# Patient Record
Sex: FEMALE | Race: OTHER | HISPANIC OR LATINO | URBAN - METROPOLITAN AREA
[De-identification: names, ages, dates, MRNs, and addresses within clinical notes are randomized per-mention and may not be internally consistent; named-entity substitution may affect disease eponyms.]

---

## 2019-05-15 ENCOUNTER — INPATIENT (INPATIENT)
Facility: HOSPITAL | Age: 44
LOS: 8 days | Discharge: HOME | DRG: 582 | End: 2019-05-24
Attending: INTERNAL MEDICINE | Admitting: INTERNAL MEDICINE
Payer: MEDICAID

## 2019-05-15 VITALS
DIASTOLIC BLOOD PRESSURE: 107 MMHG | HEART RATE: 110 BPM | SYSTOLIC BLOOD PRESSURE: 174 MMHG | WEIGHT: 139.99 LBS | RESPIRATION RATE: 20 BRPM | TEMPERATURE: 100 F | OXYGEN SATURATION: 96 % | HEIGHT: 64 IN

## 2019-05-15 LAB
ALBUMIN SERPL ELPH-MCNC: 3 G/DL — LOW (ref 3.5–5)
ALP SERPL-CCNC: 77 U/L — SIGNIFICANT CHANGE UP (ref 40–120)
ALT FLD-CCNC: 68 U/L DA — HIGH (ref 10–60)
ANION GAP SERPL CALC-SCNC: 8 MMOL/L — SIGNIFICANT CHANGE UP (ref 5–17)
AST SERPL-CCNC: 91 U/L — HIGH (ref 10–40)
BASOPHILS # BLD AUTO: 0.03 K/UL — SIGNIFICANT CHANGE UP (ref 0–0.2)
BASOPHILS NFR BLD AUTO: 0.4 % — SIGNIFICANT CHANGE UP (ref 0–2)
BILIRUB SERPL-MCNC: 0.8 MG/DL — SIGNIFICANT CHANGE UP (ref 0.2–1.2)
BUN SERPL-MCNC: 6 MG/DL — LOW (ref 7–18)
CALCIUM SERPL-MCNC: 7.9 MG/DL — LOW (ref 8.4–10.5)
CHLORIDE SERPL-SCNC: 108 MMOL/L — SIGNIFICANT CHANGE UP (ref 96–108)
CO2 SERPL-SCNC: 23 MMOL/L — SIGNIFICANT CHANGE UP (ref 22–31)
CREAT SERPL-MCNC: 0.6 MG/DL — SIGNIFICANT CHANGE UP (ref 0.5–1.3)
EOSINOPHIL # BLD AUTO: 0.02 K/UL — SIGNIFICANT CHANGE UP (ref 0–0.5)
EOSINOPHIL NFR BLD AUTO: 0.3 % — SIGNIFICANT CHANGE UP (ref 0–6)
GLUCOSE SERPL-MCNC: 80 MG/DL — SIGNIFICANT CHANGE UP (ref 70–99)
HCG SERPL-ACNC: <1 MIU/ML — SIGNIFICANT CHANGE UP
HCT VFR BLD CALC: 44.2 % — SIGNIFICANT CHANGE UP (ref 34.5–45)
HGB BLD-MCNC: 14.8 G/DL — SIGNIFICANT CHANGE UP (ref 11.5–15.5)
IMM GRANULOCYTES NFR BLD AUTO: 0.4 % — SIGNIFICANT CHANGE UP (ref 0–1.5)
LYMPHOCYTES # BLD AUTO: 1.22 K/UL — SIGNIFICANT CHANGE UP (ref 1–3.3)
LYMPHOCYTES # BLD AUTO: 18.3 % — SIGNIFICANT CHANGE UP (ref 13–44)
MCHC RBC-ENTMCNC: 30.8 PG — SIGNIFICANT CHANGE UP (ref 27–34)
MCHC RBC-ENTMCNC: 33.5 GM/DL — SIGNIFICANT CHANGE UP (ref 32–36)
MCV RBC AUTO: 92.1 FL — SIGNIFICANT CHANGE UP (ref 80–100)
MONOCYTES # BLD AUTO: 0.9 K/UL — SIGNIFICANT CHANGE UP (ref 0–0.9)
MONOCYTES NFR BLD AUTO: 13.5 % — SIGNIFICANT CHANGE UP (ref 2–14)
NEUTROPHILS # BLD AUTO: 4.48 K/UL — SIGNIFICANT CHANGE UP (ref 1.8–7.4)
NEUTROPHILS NFR BLD AUTO: 67.1 % — SIGNIFICANT CHANGE UP (ref 43–77)
NRBC # BLD: 0 /100 WBCS — SIGNIFICANT CHANGE UP (ref 0–0)
PLATELET # BLD AUTO: 227 K/UL — SIGNIFICANT CHANGE UP (ref 150–400)
POTASSIUM SERPL-MCNC: 3.2 MMOL/L — LOW (ref 3.5–5.3)
POTASSIUM SERPL-SCNC: 3.2 MMOL/L — LOW (ref 3.5–5.3)
PROT SERPL-MCNC: 6.8 G/DL — SIGNIFICANT CHANGE UP (ref 6–8.3)
RBC # BLD: 4.8 M/UL — SIGNIFICANT CHANGE UP (ref 3.8–5.2)
RBC # FLD: 12.9 % — SIGNIFICANT CHANGE UP (ref 10.3–14.5)
SODIUM SERPL-SCNC: 139 MMOL/L — SIGNIFICANT CHANGE UP (ref 135–145)
WBC # BLD: 6.68 K/UL — SIGNIFICANT CHANGE UP (ref 3.8–10.5)
WBC # FLD AUTO: 6.68 K/UL — SIGNIFICANT CHANGE UP (ref 3.8–10.5)

## 2019-05-15 PROCEDURE — 71260 CT THORAX DX C+: CPT | Mod: 26

## 2019-05-15 RX ORDER — MORPHINE SULFATE 50 MG/1
4 CAPSULE, EXTENDED RELEASE ORAL ONCE
Refills: 0 | Status: DISCONTINUED | OUTPATIENT
Start: 2019-05-15 | End: 2019-05-15

## 2019-05-15 RX ORDER — SODIUM CHLORIDE 9 MG/ML
1000 INJECTION INTRAMUSCULAR; INTRAVENOUS; SUBCUTANEOUS ONCE
Refills: 0 | Status: COMPLETED | OUTPATIENT
Start: 2019-05-15 | End: 2019-05-15

## 2019-05-15 RX ADMIN — SODIUM CHLORIDE 2000 MILLILITER(S): 9 INJECTION INTRAMUSCULAR; INTRAVENOUS; SUBCUTANEOUS at 22:44

## 2019-05-15 RX ADMIN — SODIUM CHLORIDE 1000 MILLILITER(S): 9 INJECTION INTRAMUSCULAR; INTRAVENOUS; SUBCUTANEOUS at 22:44

## 2019-05-15 RX ADMIN — MORPHINE SULFATE 4 MILLIGRAM(S): 50 CAPSULE, EXTENDED RELEASE ORAL at 22:43

## 2019-05-15 NOTE — ED PROVIDER NOTE - OBJECTIVE STATEMENT
42 y/o F with past hx of Breast CA and Brain CA (was off chemo for 4 years, but started again after her brain CA started) and HTN presents to the ED c/o breast pain. Pt notes that 3 years ago, she had surgery for breast CA and now is extremely swollen and has a lot of pain. Pt states that her breast has been swollen for approximately 1-2 weeks. She also notes of redness to the area. Pt denies fevers or nipple discharge. Pt is on memantine, Keppra, and amlodipine. She is allergic to penicillin.  Surgery: Dr. Navarrete  Oncology: Dr. Augie Brantley  [phone]   ID: [id number] 42 y/o F with past hx of Breast CA and Brain CA (was off chemo for 4 years, but started again after her brain CA started) and HTN presents to the ED c/o L breast pain. Pt notes that 3 years ago, she had surgery for L breast CA (Dr. Navarrete, Oncology: Dr. Augie Brantley, [phone]) and now is extremely swollen and has a lot of pain. Pt states that her breast has been swollen for approximately 1-2 weeks. She also notes of redness to the area. Pt denies fevers or nipple discharge. Pt is on memantine, Keppra, and amlodipine. She is allergic to penicillin (hives).   ID: [id number]

## 2019-05-15 NOTE — ED PROVIDER NOTE - PHYSICAL EXAMINATION
Afebrile, hemodynamically stable, saturating well  NAD, well appearing  Head NCAT  EOMI grossly, anicteric  MMM  RRR, nml S1/S2, no m/r/g  Lungs CTAB, no w/r/r  Breasts (LAVELL Hayes as chaperone): Swollen, tender, erythematous L lower outer breast with external erythematous growth, no nipple discharge or discoloration  AAO, CN's 3-12 grossly intact  SAMAYOA spontaneously, no leg cyanosis or edema  Skin warm, well perfused

## 2019-05-15 NOTE — ED PROVIDER NOTE - CLINICAL SUMMARY MEDICAL DECISION MAKING FREE TEXT BOX
No CP/SOB to suggest ACS/PE or other etiology. No CP/SOB to suggest ACS/PE or other etiology. Noted likely recurrence of breast cancer as well as effusion concerning for malignancy. No SOB or work of breathing. No signs of cord compression on exam. Patient well appearing, hemodynamically stable. Discussed with Dr. Ponce of surgery as courtesy as this is their past patient, and will see pt on admission. Admitted to internal medicine for further monitoring, w/u, and care. No CP/SOB to suggest ACS/PE or other etiology. Noted likely recurrence of breast cancer as well as effusion concerning for malignancy. No SOB or work of breathing. No signs of cord compression on exam. No fever and appearance low suspicion for cellulitis. Patient well appearing, hemodynamically stable. Discussed with Dr. Ponce of surgery as courtesy as this is their past patient, and will see pt on admission. Admitted to internal medicine for further monitoring, w/u, and care.

## 2019-05-15 NOTE — ED PROVIDER NOTE - CARE PLAN
Principal Discharge DX:	Metastatic breast cancer  Secondary Diagnosis:	Pleural effusion, malignant  Secondary Diagnosis:	Bony metastasis  Secondary Diagnosis:	Compression fracture of spine

## 2019-05-16 DIAGNOSIS — J90 PLEURAL EFFUSION, NOT ELSEWHERE CLASSIFIED: ICD-10-CM

## 2019-05-16 DIAGNOSIS — C50.919 MALIGNANT NEOPLASM OF UNSPECIFIED SITE OF UNSPECIFIED FEMALE BREAST: ICD-10-CM

## 2019-05-16 DIAGNOSIS — Z98.891 HISTORY OF UTERINE SCAR FROM PREVIOUS SURGERY: Chronic | ICD-10-CM

## 2019-05-16 DIAGNOSIS — Z90.49 ACQUIRED ABSENCE OF OTHER SPECIFIED PARTS OF DIGESTIVE TRACT: Chronic | ICD-10-CM

## 2019-05-16 DIAGNOSIS — L03.90 CELLULITIS, UNSPECIFIED: ICD-10-CM

## 2019-05-16 DIAGNOSIS — I10 ESSENTIAL (PRIMARY) HYPERTENSION: ICD-10-CM

## 2019-05-16 DIAGNOSIS — Z29.9 ENCOUNTER FOR PROPHYLACTIC MEASURES, UNSPECIFIED: ICD-10-CM

## 2019-05-16 LAB
ALBUMIN SERPL ELPH-MCNC: 2.9 G/DL — LOW (ref 3.5–5)
ALP SERPL-CCNC: 98 U/L — SIGNIFICANT CHANGE UP (ref 40–120)
ALT FLD-CCNC: 91 U/L DA — HIGH (ref 10–60)
ANION GAP SERPL CALC-SCNC: 7 MMOL/L — SIGNIFICANT CHANGE UP (ref 5–17)
AST SERPL-CCNC: 124 U/L — HIGH (ref 10–40)
BASOPHILS # BLD AUTO: 0.03 K/UL — SIGNIFICANT CHANGE UP (ref 0–0.2)
BASOPHILS NFR BLD AUTO: 0.4 % — SIGNIFICANT CHANGE UP (ref 0–2)
BILIRUB SERPL-MCNC: 0.8 MG/DL — SIGNIFICANT CHANGE UP (ref 0.2–1.2)
BUN SERPL-MCNC: 4 MG/DL — LOW (ref 7–18)
CALCIUM SERPL-MCNC: 7.5 MG/DL — LOW (ref 8.4–10.5)
CHLORIDE SERPL-SCNC: 107 MMOL/L — SIGNIFICANT CHANGE UP (ref 96–108)
CHOLEST SERPL-MCNC: 171 MG/DL — SIGNIFICANT CHANGE UP (ref 10–199)
CO2 SERPL-SCNC: 24 MMOL/L — SIGNIFICANT CHANGE UP (ref 22–31)
CREAT SERPL-MCNC: 0.51 MG/DL — SIGNIFICANT CHANGE UP (ref 0.5–1.3)
EOSINOPHIL # BLD AUTO: 0.02 K/UL — SIGNIFICANT CHANGE UP (ref 0–0.5)
EOSINOPHIL NFR BLD AUTO: 0.3 % — SIGNIFICANT CHANGE UP (ref 0–6)
GLUCOSE SERPL-MCNC: 93 MG/DL — SIGNIFICANT CHANGE UP (ref 70–99)
HBA1C BLD-MCNC: 5.5 % — SIGNIFICANT CHANGE UP (ref 4–5.6)
HCT VFR BLD CALC: 42.8 % — SIGNIFICANT CHANGE UP (ref 34.5–45)
HDLC SERPL-MCNC: 58 MG/DL — SIGNIFICANT CHANGE UP
HGB BLD-MCNC: 14.3 G/DL — SIGNIFICANT CHANGE UP (ref 11.5–15.5)
IMM GRANULOCYTES NFR BLD AUTO: 0.3 % — SIGNIFICANT CHANGE UP (ref 0–1.5)
LIPID PNL WITH DIRECT LDL SERPL: 98 MG/DL — SIGNIFICANT CHANGE UP
LYMPHOCYTES # BLD AUTO: 0.95 K/UL — LOW (ref 1–3.3)
LYMPHOCYTES # BLD AUTO: 13.6 % — SIGNIFICANT CHANGE UP (ref 13–44)
MAGNESIUM SERPL-MCNC: 2.1 MG/DL — SIGNIFICANT CHANGE UP (ref 1.6–2.6)
MCHC RBC-ENTMCNC: 30.7 PG — SIGNIFICANT CHANGE UP (ref 27–34)
MCHC RBC-ENTMCNC: 33.4 GM/DL — SIGNIFICANT CHANGE UP (ref 32–36)
MCV RBC AUTO: 91.8 FL — SIGNIFICANT CHANGE UP (ref 80–100)
MONOCYTES # BLD AUTO: 0.73 K/UL — SIGNIFICANT CHANGE UP (ref 0–0.9)
MONOCYTES NFR BLD AUTO: 10.4 % — SIGNIFICANT CHANGE UP (ref 2–14)
NEUTROPHILS # BLD AUTO: 5.26 K/UL — SIGNIFICANT CHANGE UP (ref 1.8–7.4)
NEUTROPHILS NFR BLD AUTO: 75 % — SIGNIFICANT CHANGE UP (ref 43–77)
NRBC # BLD: 0 /100 WBCS — SIGNIFICANT CHANGE UP (ref 0–0)
PHOSPHATE SERPL-MCNC: 2.6 MG/DL — SIGNIFICANT CHANGE UP (ref 2.5–4.5)
PLATELET # BLD AUTO: 221 K/UL — SIGNIFICANT CHANGE UP (ref 150–400)
POTASSIUM SERPL-MCNC: 3.1 MMOL/L — LOW (ref 3.5–5.3)
POTASSIUM SERPL-SCNC: 3.1 MMOL/L — LOW (ref 3.5–5.3)
PROT SERPL-MCNC: 6.4 G/DL — SIGNIFICANT CHANGE UP (ref 6–8.3)
RBC # BLD: 4.66 M/UL — SIGNIFICANT CHANGE UP (ref 3.8–5.2)
RBC # FLD: 13 % — SIGNIFICANT CHANGE UP (ref 10.3–14.5)
SODIUM SERPL-SCNC: 138 MMOL/L — SIGNIFICANT CHANGE UP (ref 135–145)
TOTAL CHOLESTEROL/HDL RATIO MEASUREMENT: 2.9 RATIO — LOW (ref 3.3–7.1)
TRIGL SERPL-MCNC: 77 MG/DL — SIGNIFICANT CHANGE UP (ref 10–149)
TSH SERPL-MCNC: 1.43 UU/ML — SIGNIFICANT CHANGE UP (ref 0.34–4.82)
WBC # BLD: 7.01 K/UL — SIGNIFICANT CHANGE UP (ref 3.8–10.5)
WBC # FLD AUTO: 7.01 K/UL — SIGNIFICANT CHANGE UP (ref 3.8–10.5)

## 2019-05-16 PROCEDURE — 99223 1ST HOSP IP/OBS HIGH 75: CPT

## 2019-05-16 PROCEDURE — 99223 1ST HOSP IP/OBS HIGH 75: CPT | Mod: 57

## 2019-05-16 PROCEDURE — 99285 EMERGENCY DEPT VISIT HI MDM: CPT

## 2019-05-16 PROCEDURE — ZZZZZ: CPT

## 2019-05-16 RX ORDER — CEFEPIME 1 G/1
1000 INJECTION, POWDER, FOR SOLUTION INTRAMUSCULAR; INTRAVENOUS ONCE
Refills: 0 | Status: COMPLETED | OUTPATIENT
Start: 2019-05-16 | End: 2019-05-16

## 2019-05-16 RX ORDER — CEFTRIAXONE 500 MG/1
1 INJECTION, POWDER, FOR SOLUTION INTRAMUSCULAR; INTRAVENOUS ONCE
Refills: 0 | Status: DISCONTINUED | OUTPATIENT
Start: 2019-05-16 | End: 2019-05-16

## 2019-05-16 RX ORDER — AMLODIPINE BESYLATE 2.5 MG/1
5 TABLET ORAL DAILY
Refills: 0 | Status: DISCONTINUED | OUTPATIENT
Start: 2019-05-16 | End: 2019-05-22

## 2019-05-16 RX ORDER — CEFEPIME 1 G/1
INJECTION, POWDER, FOR SOLUTION INTRAMUSCULAR; INTRAVENOUS
Refills: 0 | Status: DISCONTINUED | OUTPATIENT
Start: 2019-05-16 | End: 2019-05-16

## 2019-05-16 RX ORDER — POTASSIUM CHLORIDE 20 MEQ
10 PACKET (EA) ORAL
Refills: 0 | Status: COMPLETED | OUTPATIENT
Start: 2019-05-16 | End: 2019-05-16

## 2019-05-16 RX ORDER — IPRATROPIUM/ALBUTEROL SULFATE 18-103MCG
3 AEROSOL WITH ADAPTER (GRAM) INHALATION EVERY 6 HOURS
Refills: 0 | Status: DISCONTINUED | OUTPATIENT
Start: 2019-05-16 | End: 2019-05-22

## 2019-05-16 RX ORDER — CEFEPIME 1 G/1
1000 INJECTION, POWDER, FOR SOLUTION INTRAMUSCULAR; INTRAVENOUS EVERY 8 HOURS
Refills: 0 | Status: DISCONTINUED | OUTPATIENT
Start: 2019-05-16 | End: 2019-05-16

## 2019-05-16 RX ORDER — ACETAMINOPHEN 500 MG
650 TABLET ORAL EVERY 6 HOURS
Refills: 0 | Status: DISCONTINUED | OUTPATIENT
Start: 2019-05-16 | End: 2019-05-22

## 2019-05-16 RX ORDER — ENOXAPARIN SODIUM 100 MG/ML
40 INJECTION SUBCUTANEOUS DAILY
Refills: 0 | Status: DISCONTINUED | OUTPATIENT
Start: 2019-05-16 | End: 2019-05-22

## 2019-05-16 RX ORDER — VANCOMYCIN HCL 1 G
1000 VIAL (EA) INTRAVENOUS ONCE
Refills: 0 | Status: COMPLETED | OUTPATIENT
Start: 2019-05-16 | End: 2019-05-16

## 2019-05-16 RX ORDER — CEFTRIAXONE 500 MG/1
INJECTION, POWDER, FOR SOLUTION INTRAMUSCULAR; INTRAVENOUS
Refills: 0 | Status: DISCONTINUED | OUTPATIENT
Start: 2019-05-16 | End: 2019-05-16

## 2019-05-16 RX ADMIN — Medication 100 MILLIEQUIVALENT(S): at 13:09

## 2019-05-16 RX ADMIN — Medication 100 MILLIEQUIVALENT(S): at 16:51

## 2019-05-16 RX ADMIN — MORPHINE SULFATE 4 MILLIGRAM(S): 50 CAPSULE, EXTENDED RELEASE ORAL at 03:17

## 2019-05-16 RX ADMIN — Medication 250 MILLIGRAM(S): at 06:50

## 2019-05-16 RX ADMIN — CEFEPIME 100 MILLIGRAM(S): 1 INJECTION, POWDER, FOR SOLUTION INTRAMUSCULAR; INTRAVENOUS at 13:08

## 2019-05-16 RX ADMIN — CEFEPIME 100 MILLIGRAM(S): 1 INJECTION, POWDER, FOR SOLUTION INTRAMUSCULAR; INTRAVENOUS at 05:56

## 2019-05-16 RX ADMIN — AMLODIPINE BESYLATE 5 MILLIGRAM(S): 2.5 TABLET ORAL at 05:56

## 2019-05-16 RX ADMIN — ENOXAPARIN SODIUM 40 MILLIGRAM(S): 100 INJECTION SUBCUTANEOUS at 11:50

## 2019-05-16 RX ADMIN — Medication 100 MILLIEQUIVALENT(S): at 14:35

## 2019-05-16 RX ADMIN — Medication 100 MILLIGRAM(S): at 21:50

## 2019-05-16 NOTE — CONSULT NOTE ADULT - CONSTITUTIONAL DETAILS
well-nourished/well-developed/well-groomed well-nourished/well-developed/distress due to pain/well-groomed

## 2019-05-16 NOTE — H&P ADULT - PROBLEM SELECTOR PLAN 3
left chest large effusion with complete white out of lung,  breathing comfortably at rest, only exertional sob   Will need thoracentesis and likely chest tube placement  Eval by thoracic sx vs IR  Supplemental O2 as needed   duoneb prn left chest large effusion with complete white out of lung,  breathing comfortably at rest, only exertional sob   Will need thoracentesis and likely chest tube placement  Eval by thoracic sx , consulted already  Supplemental O2 as needed   duoneb prn

## 2019-05-16 NOTE — CONSULT NOTE ADULT - ASSESSMENT
44 y/o female w/ left pleural effusion, suspicious for malignant effusion, here w/ left breast fungating mass, hx invasive ductal ca in-situ    -Rec thoracocentesis of lt pleural effusion, pt not an ideal candidate for PleurX or Pigtail catheter given close proximity to fungating mass/ affected area (possibility of co- infection) and planned excision by Gen surgery w/ possible Plastic sx involvement     -Oncology eval   -Care as per medical team   -Will follow   -D/w Dr Camarillo and agrees

## 2019-05-16 NOTE — CONSULT NOTE ADULT - ATTENDING COMMENTS
patient with recurrent breast ca and large pleural effusion - asymptomatic from respiratory standpoint. surg oncology plan for possible resection after complete work up for mets and medical clearance. would not like to disturb possible skin/muscle flaps asking to defer tube placement - can have thoracentesis if becomes symptomatic.
pt seen and  examined with ID resident

## 2019-05-16 NOTE — H&P ADULT - PROBLEM SELECTOR PLAN 2
Fungating left breast mass with overlying cellulitis, low grade fever   Will rx with broad spectrum abx  start with cefepime ( pan allergic)  s/p one dose of Vancomycin   Will obtain blood cx   Tylenol prn Fungating left breast mass with overlying cellulitis, low grade fever   Will rx with broad spectrum abx  start with cefepime ( pan allergic)  s/p one dose of Vancomycin   Will obtain blood cx   Tylenol prn  Consulted ID, Dr. Lawrence

## 2019-05-16 NOTE — CONSULT NOTE ADULT - ASSESSMENT
1. Pleural Effusion  - Large Left pleural effusion with complete collapse of LLL and near complete collapse of NARCISO.   - R/O malignant effusion  - Will need Thoracocentesis  - Thoracic surgery consult noted; not a candidate for Pleurex  - Bronchodilators  - O2 Supplement  - F/U XR.     2. Metastatic Breast CA   - Now with left breast fungating mass   - S/P chemo  - On hormonal therapy  - Spine Mets  - S/P radiation  - Heme/onc eval  - Surgical eval noted.   - ID eval noted.    - Abx for cellulitis of left breast   - Pain control.   - DVT and GI PPX

## 2019-05-16 NOTE — CONSULT NOTE ADULT - GASTROINTESTINAL DETAILS
no distention/no rebound tenderness/soft/nontender no rigidity/no organomegaly/no guarding/no distention/bowel sounds normal/soft/nontender

## 2019-05-16 NOTE — H&P ADULT - PROBLEM SELECTOR PLAN 1
Comes in with metastatic breast cancer, left breast fungating mass with lytic lesions on spine   h/o invasive ductal ca insitu( ER, [place] negative, Her2 positive), s/p surgery in 2015, followed by chemotherapy, on maintainance hormonal therapy, with questionable mets to C-spine and s/p radiation  Will need eval from surgery and hem-onc for further plan of mx  Palliative consult  Pain mx

## 2019-05-16 NOTE — CONSULT NOTE ADULT - RS GEN PE MLT RESP DETAILS PC
respirations non-labored/diminished breath sounds, L/airway patent no wheezes/diminished breath sounds, L/respirations non-labored/no rales/no rhonchi

## 2019-05-16 NOTE — H&P ADULT - ASSESSMENT
43 female with PMH of invasive ductal ca insitu( ER, [place] negative, Her2 positive), s/p surgery in 2015, followed by chemotherapy, on maintainance hormonal therapy, with questionable mets to C-spine and s/p radiation, HTN, comes in with complaint of Left breast pain and mass *2 weeks. Patient had sudden onset pain and then fast growing mass and swelling with involvement of overlying skin. Denies any fever, chills, nausea, vomiting , abdominal pain, nipple discharge.   Admits to have exertional sob. Patient is being followed up on op basis by  who did surgery and Dr. Brantley.     In Ed, BP: 174/107 mm hg, HR: 110, Temp: 100.2 F  cbc wnl   bmp shows k of 3.2   CT shows complete white out of left lung   Marked masslike thickening of the outer lower quadrant of the left breast with underlying confluent breast mass measuring up to 4.8 x 3.6 cm compatible with carcinoma.  Large left pleural effusion with complete collapse of the left lower lobe and near complete collapse of the left upper lobe, presumably malignant effusion.  Several sclerotic lesions within the spine most likely representing bony metastatic disease. Compression deformities of T3, T7, and T10 likely representing pathologic compression deformities.    Will admit to medicine for Left breast mass, likely overlying cellulitis, Malignant pleural effusion.

## 2019-05-16 NOTE — CONSULT NOTE ADULT - NEGATIVE GASTROINTESTINAL SYMPTOMS
no nausea/no vomiting/no diarrhea/no constipation no nausea/no abdominal pain/no vomiting/no diarrhea/no constipation

## 2019-05-16 NOTE — CONSULT NOTE ADULT - MUSCULOSKELETAL
detailed exam ROM intact/no joint swelling/no joint erythema/no joint warmth no joint swelling/no joint erythema/no joint warmth

## 2019-05-16 NOTE — H&P ADULT - ATTENDING COMMENTS
43 female with PMH of invasive ductal ca insitu( ER, [place] negative, Her2 positive), s/p surgery in 2015, followed by chemotherapy, on maintainance hormonal therapy, with questionable mets to C-spine and s/p radiation, HTN, comes in with complaint of Left breast pain and mass *2 weeks. Patient had sudden onset pain and then fast growing mass and swelling with involvement of overlying skin. Denies any fever, chills, nausea, vomiting , abdominal pain, nipple discharge.   Admits to have exertional sob. Patient is being followed up on op basis by  who did surgery and Dr. Brantley.     In Ed, BP: 174/107 mm hg, HR: 110, Temp: 100.2 F  cbc wnl   bmp shows k of 3.2   CT shows complete white out of left lung   Marked masslike thickening of the outer lower quadrant of the left breast with underlying confluent breast mass measuring up to 4.8 x 3.6 cm compatible with carcinoma.  Large left pleural effusion with complete collapse of the left lower lobe and near complete collapse of the left upper lobe, presumably malignant effusion.  Several sclerotic lesions within the spine most likely representing bony metastatic disease. Compression deformities of T3, T7, and T10 likely representing pathologic compression deformities.    pt seen in bed, vitals stable except elevated bp and tachicardiaand temp 100.2, physical exam reveals left breast edematous, tender, 6-6 o clock lesion with mild erythema,  lungs with decrease bs left mid to lower lobe, heart s1s2, abd soft nd nt bs+, ext no edema. labs and diagnostic test result reviewed.    assessment  --  left breast cellulitis, r/o recurrence of breast ca with mets, left pleural eff likely malignant, uncontrolled bp poss 2nd to pain, h/o lung mets, h/o nvasive ductal ca insitu( ER, [place] negative, Her2 positive), s/p surgery in 2015, followed by chemotherapy, on maintainance hormonal therapy, with questionable mets to C-spine and s/p radiation, HTN    plan  --  admit to med, vanco, maxipime, pain control, cont preadmit home meds, gi and dvt profilaxis, supplement potassium for hypokalemia  cbc, bmp, mg, phos, lipids, tsh, bld cx, ua, ucx,    thoracic surg cons  gen surg cons  pulm cons  id cons  heme-onc cons dr luis alfredo Rowley

## 2019-05-16 NOTE — CONSULT NOTE ADULT - SUBJECTIVE AND OBJECTIVE BOX
HPI:  43 female with PMH of invasive ductal ca insitu( ER, [place] negative, Her2 positive), s/p surgery in 2015, followed by chemotherapy, on maintainance hormonal therapy, with questionable mets to C-spine and s/p radiation, HTN, comes in with complaint of Left breast pain and mass *2 weeks. Patient had sudden onset pain and then fast growing mass and swelling with involvement of overlying skin. Denies any fever, chills, nausea, vomiting , abdominal pain, nipple discharge. Admits to have exertional sob. Patient is being followed up on op basis by  who did surgery and Dr. Brantley.     History as above. Patient  is at bedside assisting with the history of the patient. Patient states that she had surgery of breast mass in . Mass currently percent began to develop 1 month ago. She denies discharge or pus coming out of the mass. Denies nipple discharge. Denies headache, chest pain, shortness of breath, nausea or vomiting.       PAST MEDICAL & SURGICAL HISTORY:  Breast CA  History of hypertension  S/P appendectomy  H/O:   S/P cholecystectomy      penicillin (Pruritus; Rash)      Meds:  acetaminophen   Tablet .. 650 milliGRAM(s) Oral every 6 hours PRN  ALBUTerol/ipratropium for Nebulization. 3 milliLiter(s) Nebulizer every 6 hours PRN  amLODIPine   Tablet 5 milliGRAM(s) Oral daily  cefepime   IVPB 1000 milliGRAM(s) IV Intermittent every 8 hours  cefepime   IVPB      enoxaparin Injectable 40 milliGRAM(s) SubCutaneous daily  potassium chloride  10 mEq/100 mL IVPB 10 milliEquivalent(s) IV Intermittent every 1 hour      SOCIAL HISTORY:  Smoker:  YES / NO        PACK YEARS:                         WHEN QUIT?  ETOH use:  YES / NO               FREQUENCY / QUANTITY:  Ilicit Drug use:  YES / NO  Occupation:  Assisted device use (Cane / Walker):  Live with:    FAMILY HISTORY:  No pertinent family history in first degree relatives      VITALS:  Vital Signs Last 24 Hrs  T(C): 37.3 (16 May 2019 07:55), Max: 37.9 (15 May 2019 19:28)  T(F): 99.2 (16 May 2019 07:55), Max: 100.2 (15 May 2019 19:28)  HR: 98 (16 May 2019 07:55) (94 - 110)  BP: 150/98 (16 May 2019 07:55) (150/98 - 174/107)  BP(mean): --  RR: 18 (16 May 2019 07:55) (18 - 20)  SpO2: 98% (16 May 2019 07:55) (94% - 98%)    LABS/DIAGNOSTIC TESTS:                          14.3   7.01  )-----------( 221      ( 16 May 2019 10:41 )             42.8     WBC Count: 7.01 K/uL ( @ 10:41)  WBC Count: 6.68 K/uL (05-15 @ 22:36)          138  |  107  |  4<L>  ----------------------------<  93  3.1<L>   |  24  |  0.51    Ca    7.5<L>      16 May 2019 10:41  Phos  2.6       Mg     2.1         TPro  6.4  /  Alb  2.9<L>  /  TBili  0.8  /  DBili  x   /  AST  124<H>  /  ALT  91<H>  /  AlkPhos  98  -16          LIVER FUNCTIONS - ( 16 May 2019 10:41 )  Alb: 2.9 g/dL / Pro: 6.4 g/dL / ALK PHOS: 98 U/L / ALT: 91 U/L DA / AST: 124 U/L / GGT: x                 LACTATE:    ABG -     CULTURES:       RADIOLOGY:      ROS  [  ] UNABLE TO ELICIT HPI:  43 female with PMH of invasive ductal ca insitu( ER, [place] negative, Her2 positive), s/p surgery in 2015, followed by chemotherapy, on maintainance hormonal therapy, with questionable mets to C-spine and s/p radiation, HTN, comes in with complaint of Left breast pain and mass *2 weeks. Patient had sudden onset pain and then fast growing mass and swelling with involvement of overlying skin. Denies any fever, chills, nausea, vomiting , abdominal pain, nipple discharge. Admits to have exertional sob. Patient is being followed up on op basis by  who did surgery and Dr. Brantley.     History as above. Patient  is at bedside assisting with the history of the patient. Patient states that she had surgical removal of a breast mass in 2015 along with chemo(apparently the pt had metastatic disease then . Denies nipple discharge. Denies headache, chest pain, shortness of breath, nausea or vomiting. she has not had any fevers but has developed a recurrence of her breast mass over the last several months along with severe breast pain, she has brain and bone mets and got radiation to her brain.      PAST MEDICAL & SURGICAL HISTORY:  Breast CA  History of hypertension  S/P appendectomy  H/O:   S/P cholecystectomy      penicillin (Pruritus; Rash)      Meds:  acetaminophen   Tablet .. 650 milliGRAM(s) Oral every 6 hours PRN  ALBUTerol/ipratropium for Nebulization. 3 milliLiter(s) Nebulizer every 6 hours PRN  amLODIPine   Tablet 5 milliGRAM(s) Oral daily  cefepime   IVPB 1000 milliGRAM(s) IV Intermittent every 8 hours  cefepime   IVPB      enoxaparin Injectable 40 milliGRAM(s) SubCutaneous daily  potassium chloride  10 mEq/100 mL IVPB 10 milliEquivalent(s) IV Intermittent every 1 hour      SOCIAL HISTORY:  Smoker:  no  ETOH use: no    FAMILY HISTORY:  No pertinent family history in first degree relatives      VITALS:  Vital Signs Last 24 Hrs  T(C): 37.3 (16 May 2019 07:55), Max: 37.9 (15 May 2019 19:28)  T(F): 99.2 (16 May 2019 07:55), Max: 100.2 (15 May 2019 19:28)  HR: 98 (16 May 2019 07:55) (94 - 110)  BP: 150/98 (16 May 2019 07:55) (150/98 - 174/107)  BP(mean): --  RR: 18 (16 May 2019 07:55) (18 - 20)  SpO2: 98% (16 May 2019 07:55) (94% - 98%)    LABS/DIAGNOSTIC TESTS:                          14.3   7.01  )-----------( 221      ( 16 May 2019 10:41 )             42.8     WBC Count: 7.01 K/uL ( @ 10:41)  WBC Count: 6.68 K/uL (05-15 @ 22:36)          138  |  107  |  4<L>  ----------------------------<  93  3.1<L>   |  24  |  0.51    Ca    7.5<L>      16 May 2019 10:41  Phos  2.6       Mg     2.1         TPro  6.4  /  Alb  2.9<L>  /  TBili  0.8  /  DBili  x   /  AST  124<H>  /  ALT  91<H>  /  AlkPhos  98            LIVER FUNCTIONS - ( 16 May 2019 10:41 )  Alb: 2.9 g/dL / Pro: 6.4 g/dL / ALK PHOS: 98 U/L / ALT: 91 U/L DA / AST: 124 U/L / GGT: x                 LACTATE:    ABG -     CULTURES:       RADIOLOGY:< from: CT Chest w/ IV Cont (05.15.19 @ 23:55) >  EXAM:  CT CHEST IC                            PROCEDURE DATE:  05/15/2019          INTERPRETATION:  CLINICAL INFORMATION: Left breast mass, history of   carcinoma.    COMPARISON: None.    PROCEDURE:   CT of the Chest was performed with intravenous contrast.  90 ml of Omnipaque 350 was injected intravenously. 10 ml were discarded.  Sagittal and coronal reformats were performed.      FINDINGS:    LUNGS AND AIRWAYS AND PLEURA: Patent central airways.  Large left pleural   effusion with complete collapse of the left lower lobe and near complete   collapse of the left upper lobe. Calcified granuloma in the collapsed   left lower lobe. Smooth interlobular septal thickening at the right lung   base. Trace right pleural effusion.    MEDIASTINUM ANDHILA: Few mildly enlarged left paratracheal lymph nodes   measuring up to 1.3 cm. Shift of the mediastinum towards the right   secondary to the large left pleural effusion.    VESSELS: Within normal limits.    HEART: Heart size is normal. Trace pericardial fluid.    CHEST WALL AND LOWER NECK: Marked masslike thickening of the outer lower   quadrant of the left breast with underlying confluent breast mass   measuring up to 4.8 x 3.6 cm.    VISUALIZED UPPER ABDOMEN: Hepatic steatosis. Intrahepatic and extra   hepatic biliary ductal dilatation in the setting of a cholecystectomy.    BONES: Several sclerotic lesions within the spine most likely   representing bony metastatic disease. Mild compression of the T3 and T10   vertebral bodies and moderate compression of the T7 vertebral body likely   representing pathologic compression deformities.    IMPRESSION:     Marked masslike thickening of the outer lower quadrant of the left breast   with underlying confluent breast mass measuring up to 4.8x 3.6 cm   compatible with carcinoma.    Large left pleural effusion with complete collapse of the left lower lobe   and near complete collapse of the left upper lobe, presumably malignant   effusion.    Smooth interlobular septal thickening at the right lung base likely   reflective of mild interstitial edema. Trace right pleural effusion.    Few mildly enlarged left paratracheal lymph nodes measuring up to 1.3 cm.    Several sclerotic lesions within the spine most likely representing bony   metastatic disease. Compression deformities of T3, T7, and T10 likely   representing pathologic compression deformities.    Hepatic steatosis. Intrahepatic and extra hepatic biliary ductal   dilatation in the setting of a cholecystectomy.        < end of copied text >        ROS  [  ] UNABLE TO ELICIT

## 2019-05-16 NOTE — CONSULT NOTE ADULT - PROBLEM SELECTOR RECOMMENDATION 9
Ho Left Breast Cancer  Ho Left Breast Surgery.  Metastatic Breast Cancer    Thoracic Surgery Consult  Oncology Consult  Medical Admission. Ho Left Breast Cancer, Fungating  Ho Left Breast Surgery.  Metastatic Breast Cancer    Thoracic Surgery Consult  Oncology Consult  Medical Admission.

## 2019-05-16 NOTE — H&P ADULT - HISTORY OF PRESENT ILLNESS
43 female with PMH of invasive ductal ca insitu( ER, [place] negative, Her2 positive), s/p surgery in 2015, followed by chemotherapy, on maintainance hormonal therapy, with questionable mets to C-spine and s/p radiation, HTN, comes in with complaint of Left breast pain and mass *2 weeks. Patient had sudden onset pain and then fast growing mass and swelling with involvement of overlying skin. Denies any fever, chills, nausea, vomiting , abdominal pain, nipple discharge.   Admits to have exertional sob. Patient is being followed up on op basis by  who did surgery and Dr. Brantley.     In Ed, BP: 174/107 mm hg, HR: 110, Temp: 100.2 F  cbc wnl   bmp shows k of 3.2   CT shows complete white out of left lung   Marked masslike thickening of the outer lower quadrant of the left breast with underlying confluent breast mass measuring up to 4.8 x 3.6 cm compatible with carcinoma.  Large left pleural effusion with complete collapse of the left lower lobe and near complete collapse of the left upper lobe, presumably malignant effusion.  Several sclerotic lesions within the spine most likely representing bony metastatic disease. Compression deformities of T3, T7, and T10 likely representing pathologic compression deformities.

## 2019-05-16 NOTE — CONSULT NOTE ADULT - SUBJECTIVE AND OBJECTIVE BOX
MEDICAL ONCOLOGY CONSULTATION (for Dr. Brantley)    CC: Breast cancer    HPI: Patient is well known to oncology service.    Vital Signs Last 24 Hrs  T(C): 36.7 (16 May 2019 16:02), Max: 37.9 (15 May 2019 19:28)  T(F): 98 (16 May 2019 16:02), Max: 100.2 (15 May 2019 19:28)  HR: 105 (16 May 2019 16:02) (94 - 110)  BP: 137/89 (16 May 2019 16:02) (137/89 - 174/107)  RR: 15 (16 May 2019 16:02) (15 - 20)  SpO2: 94% (16 May 2019 16:02) (94% - 98%)    NAD      LABS               14.3   7.01  )-----------( 221      ( 16 May 2019 10:41 )             42.8   05-16    138  |  107  |  4<L>  ----------------------------<  93  3.1<L>   |  24  |  0.51    Ca    7.5<L>      16 May 2019 10:41  Phos  2.6     05-16  Mg     2.1     05-16    TPro  6.4  /  Alb  2.9<L>  /  TBili  0.8  /  DBili  x   /  AST  124<H>  /  ALT  91<H>  /  AlkPhos  98  05-16    < from: CT Chest w/ IV Cont (05.15.19 @ 23:55) >  IMPRESSION:     Marked masslike thickening of the outer lower quadrant of the left breast   with underlying confluent breast mass measuring up to 4.8x 3.6 cm   compatible with carcinoma.    Large left pleural effusion with complete collapse of the left lower lobe   and near complete collapse of the left upper lobe, presumably malignant   effusion.    Smooth interlobular septal thickening at the right lung base likely   reflective of mild interstitial edema. Trace right pleural effusion.    Few mildly enlarged left paratracheal lymph nodes measuring up to 1.3 cm.    Several sclerotic lesions within the spine most likely representing bony   metastatic disease. Compression deformities of T3, T7, and T10 likely   representing pathologic compression deformities.    Hepatic steatosis. Intrahepatic and extra hepatic biliary ductal   dilatation in the setting of a cholecystectomy.        < end of copied text > MEDICAL ONCOLOGY CONSULTATION (for Dr. Brantley)    CC: Breast cancer    HPI: Patient is well known to oncology service. 43 female with PMH of invasive ductal ca  (ER, [place] negative, Her2 positive), s/p surgery in , on anti H2N chemotherapy, with  mets to C-spine and s/p radiation, HTN, comes in with complaint of Left breast pain and SOB. Patient had sudden onset pain and then fast growing mass and swelling with involvement of overlying skin. Denies any fever, chills, nausea, vomiting , abdominal pain, nipple discharge.   Admits to have exertional sob. Patient found with L chest pleural effusion. Currently pt reports adequate pain control         Review of Systems:  Other Review of Systems: All other review of systems negative, except as noted in HPI	      Allergies and Intolerances:        Allergies:  	penicillin: Drug, Pruritus, Rash    Home Medications:   * Patient Currently Takes Medications as of 31-May-2016 10:55 documented in Structured Notes  · 	benazepril 10 mg oral tablet: 1 tab(s) orally once a day  · 	amLODIPine 5 mg oral tablet: 1 tab(s) orally once a day  · 	hydrOXYzine hydrochloride 25 mg oral tablet: 1 tab(s) orally 3 times a day    Patient History:    Past Medical, Past Surgical, and Family History:  PAST MEDICAL HISTORY:  Breast CA     History of hypertension.     PAST SURGICAL HISTORY:  H/O:      S/P appendectomy     S/P cholecystectomy.     FAMILY HISTORY:  No pertinent family history in first degree relatives.     No Pertinent Family History in first degree relatives of: DM cancer.     Social History:  Social History (marital status, living situation, occupation, tobacco use, alcohol and drug use, and sexual history): Non smoker, no alcohol or drug abuse	      Vital Signs Last 24 Hrs  T(C): 36.7 (16 May 2019 16:02), Max: 37.9 (15 May 2019 19:28)  T(F): 98 (16 May 2019 16:02), Max: 100.2 (15 May 2019 19:28)  HR: 105 (16 May 2019 16:02) (94 - 110)  BP: 137/89 (16 May 2019 16:02) (137/89 - 174/107)  RR: 15 (16 May 2019 16:02) (15 - 20)  SpO2: 94% (16 May 2019 16:02) (94% - 98%)    NAD; NC/AT; alopecia  PERRLA; EOMI  supple  Decreased BS in the L lung field  Tachy  L breast mass  Ab soft; NT/ND  No CCE; warm and dry      LABS               14.3   7.01  )-----------( 221      ( 16 May 2019 10:41 )             42.8   05-    138  |  107  |  4<L>  ----------------------------<  93  3.1<L>   |  24  |  0.51    Ca    7.5<L>      16 May 2019 10:41  Phos  2.6       Mg     2.1         TPro  6.4  /  Alb  2.9<L>  /  TBili  0.8  /  DBili  x   /  AST  124<H>  /  ALT  91<H>  /  AlkPhos  98  -16    < from: CT Chest w/ IV Cont (05.15.19 @ 23:55) >  IMPRESSION:     Marked masslike thickening of the outer lower quadrant of the left breast   with underlying confluent breast mass measuring up to 4.8x 3.6 cm   compatible with carcinoma.    Large left pleural effusion with complete collapse of the left lower lobe   and near complete collapse of the left upper lobe, presumably malignant   effusion.    Smooth interlobular septal thickening at the right lung base likely   reflective of mild interstitial edema. Trace right pleural effusion.    Few mildly enlarged left paratracheal lymph nodes measuring up to 1.3 cm.    Several sclerotic lesions within the spine most likely representing bony   metastatic disease. Compression deformities of T3, T7, and T10 likely   representing pathologic compression deformities.    Hepatic steatosis. Intrahepatic and extra hepatic biliary ductal   dilatation in the setting of a cholecystectomy.        < end of copied text >

## 2019-05-16 NOTE — CONSULT NOTE ADULT - SUBJECTIVE AND OBJECTIVE BOX
PULMONARY CONSULT NOTE      TRISTAN NUNEZ  [id number]    Patient is a 43y old  Female who presents with a chief complaint of Left breast pain and swelling (16 May 2019 14:02)      HISTORY OF PRESENT ILLNESS:  History of Present Illness:  Reason for Admission: Left breast pain and swelling	  History of Present Illness: 	  43 female with PMH of invasive ductal ca insitu( ER, [place] negative, Her2 positive), s/p surgery in 2015, followed by chemotherapy, on maintainance hormonal therapy, with questionable mets to C-spine and s/p radiation, HTN, comes in with complaint of Left breast pain and mass *2 weeks. Patient had sudden onset pain and then fast growing mass and swelling with involvement of overlying skin. Denies any fever, chills, nausea, vomiting , abdominal pain, nipple discharge.   Admits to have exertional sob. Patient is being followed up on op basis by  who did surgery and Dr. Brantley.     In Ed, BP: 174/107 mm hg, HR: 110, Temp: 100.2 F  cbc wnl   bmp shows k of 3.2   CT shows complete white out of left lung   Marked masslike thickening of the outer lower quadrant of the left breast with underlying confluent breast mass measuring up to 4.8 x 3.6 cm compatible with carcinoma.  Large left pleural effusion with complete collapse of the left lower lobe and near complete collapse of the left upper lobe, presumably malignant effusion.  Several sclerotic lesions within the spine most likely representing bony metastatic disease. Compression deformities of T3, T7, and T10 likely representing pathologic compression deformities.      Pt is awake, alert, lying in bed in NAD. C/O Left breast pain and tenderness with palpation.     MEDICATIONS  (STANDING):  amLODIPine   Tablet 5 milliGRAM(s) Oral daily  cefepime   IVPB 1000 milliGRAM(s) IV Intermittent every 8 hours  cefepime   IVPB      enoxaparin Injectable 40 milliGRAM(s) SubCutaneous daily      MEDICATIONS  (PRN):  acetaminophen   Tablet .. 650 milliGRAM(s) Oral every 6 hours PRN Temp greater or equal to 38C (100.4F), Mild Pain (1 - 3)  ALBUTerol/ipratropium for Nebulization. 3 milliLiter(s) Nebulizer every 6 hours PRN Shortness of Breath and/or Wheezing      Allergies    penicillin (Pruritus; Rash)    Intolerances        PAST MEDICAL & SURGICAL HISTORY:  Breast CA  History of hypertension  S/P appendectomy  H/O:   S/P cholecystectomy      FAMILY HISTORY:  No pertinent family history in first degree relatives      SOCIAL HISTORY  Smoking History:     REVIEW OF SYSTEMS:    CONSTITUTIONAL:  No fevers, chills, sweats    HEENT:  Eyes:  No diplopia or blurred vision. ENT:  No earache, sore throat or runny nose.    CARDIOVASCULAR:  No pressure, squeezing, tightness, or heaviness about the chest; no palpitations.    RESPIRATORY:  Per HPI    GASTROINTESTINAL:  No abdominal pain, nausea, vomiting or diarrhea.    GENITOURINARY:  No dysuria, frequency or urgency.    NEUROLOGIC:  No paresthesias, fasciculations, seizures or weakness.    PSYCHIATRIC:  No disorder of thought or mood.    Vital Signs Last 24 Hrs  T(C): 36.7 (16 May 2019 16:02), Max: 37.9 (15 May 2019 19:28)  T(F): 98 (16 May 2019 16:02), Max: 100.2 (15 May 2019 19:28)  HR: 105 (16 May 2019 16:02) (94 - 110)  BP: 137/89 (16 May 2019 16:02) (137/89 - 174/107)  BP(mean): --  RR: 15 (16 May 2019 16:02) (15 - 20)  SpO2: 94% (16 May 2019 16:02) (94% - 98%)  I&O's Detail      PHYSICAL EXAMINATION:    GENERAL: The patient is a well-developed, well-nourished _____in no apparent distress. left breast fungating mass.     HEENT: Head is normocephalic and atraumatic. Extraocular muscles are intact. Mucous membranes are moist.     NECK: Supple.     LUNGS: decreased breath sounds on left.     HEART: Regular rate and rhythm without murmur.    ABDOMEN: Soft, nontender, and nondistended.  No hepatosplenomegaly is noted.    EXTREMITIES: Without any cyanosis, clubbing, rash, lesions or edema.    NEUROLOGIC: Grossly intact.      LABS:                        14.3   7.01  )-----------( 221      ( 16 May 2019 10:41 )             42.8     05-16    138  |  107  |  4<L>  ----------------------------<  93  3.1<L>   |  24  |  0.51    Ca    7.5<L>      16 May 2019 10:41  Phos  2.6       Mg     2.1         TPro  6.4  /  Alb  2.9<L>  /  TBili  0.8  /  DBili  x   /  AST  124<H>  /  ALT  91<H>  /  AlkPhos  98                          MICROBIOLOGY:    RADIOLOGY & ADDITIONAL STUDIES:    CXR:    Ct scan chest:  < from: CT Chest w/ IV Cont (05.15.19 @ 23:55) >  IMPRESSION:     Marked masslike thickening of the outer lower quadrant of the left breast   with underlying confluent breast mass measuring up to 4.8x 3.6 cm   compatible with carcinoma.    Large left pleural effusion with complete collapse of the left lower lobe   and near complete collapse of the left upper lobe, presumably malignant   effusion.    Smooth interlobular septal thickening at the right lung base likely   reflective of mild interstitial edema. Trace right pleural effusion.    Few mildly enlarged left paratracheal lymph nodes measuring up to 1.3 cm.    Several sclerotic lesions within the spine most likely representing bony   metastatic disease. Compression deformities of T3, T7, and T10 likely   representing pathologic compression deformities.    Hepatic steatosis. Intrahepatic and extra hepatic biliary ductal   dilatation in the setting of a cholecystectomy.    < end of copied text >    ekg;    echo:

## 2019-05-16 NOTE — CONSULT NOTE ADULT - ASSESSMENT
Full consult to follow 43 female with PMH of invasive ductal ca  (ER, [place] negative, Her2 positive), s/p surgery in 2015, on anti H2N chemotherapy, with  mets to C-spine and s/p radiation, HTN, comes in with complaint of Left breast pain and SOB due to probable malignant pleural effusion    Problem #1 ONC - patient with known stage IV H2N positive breast ca on palliative anti-neoplastic tx  -appreciate CT surgery and pulmonary inputs  -pt will benefit from therapeutic thoracentesis with possible pleurodesis  -optimize pain control and obtain inpt supportive care service input for GOC discussion and symptoms managment  -no plan to continue with systemic palliative chemotherapy during the inpt coures    WIll contine to follow; call with questions; d/w CT surgery and NP

## 2019-05-16 NOTE — H&P ADULT - NSHPPHYSICALEXAM_GEN_ALL_CORE
PHYSICAL EXAM:  GENERAL: NAD  HEENT: Normocephalic;  conjunctivae and sclerae clear; moist mucous membranes;   NECK : supple  CHEST/LUNG: Clear to auscultation ; reduced air entry on left side   5*5 cm left breast mass below the nipple, fungating, with erythema, tenderness +, warmth+  HEART: S1 S2  regular; no murmurs, gallops or rubs  ABDOMEN: Soft, Nontender, Nondistended; Bowel sounds present  EXTREMITIES: no cyanosis; no edema; no calf tenderness  SKIN: warm and dry; no rash  NERVOUS SYSTEM:  Awake and alert; Oriented  to place, person and time ; no new deficits

## 2019-05-16 NOTE — H&P ADULT - NSHPLABSRESULTS_GEN_ALL_CORE
14.8   6.68  )-----------( 227      ( 15 May 2019 22:36 )             44.2       05-15    139  |  108  |  6<L>  ----------------------------<  80  3.2<L>   |  23  |  0.60    Ca    7.9<L>      15 May 2019 22:36    TPro  6.8  /  Alb  3.0<L>  /  TBili  0.8  /  DBili  x   /  AST  91<H>  /  ALT  68<H>  /  AlkPhos  77  05-15      < from: CT Chest w/ IV Cont (05.15.19 @ 23:55) >      Marked masslike thickening of the outer lower quadrant of the left breast   with underlying confluent breast mass measuring up to 4.8x 3.6 cm   compatible with carcinoma.    Large left pleural effusion with complete collapse of the left lower lobe   and near complete collapse of the left upper lobe, presumably malignant   effusion.    Smooth interlobular septal thickening at the right lung base likely   reflective of mild interstitial edema. Trace right pleural effusion.    Few mildly enlarged left paratracheal lymph nodes measuring up to 1.3 cm.    Several sclerotic lesions within the spine most likely representing bony   metastatic disease. Compression deformities of T3, T7, and T10 likely   representing pathologic compression deformities.    Hepatic steatosis. Intrahepatic and extra hepatic biliary ductal   dilatation in the setting of a cholecystectomy.    < end of copied text >

## 2019-05-16 NOTE — CONSULT NOTE ADULT - SKIN COMMENTS
see breast exam
mild redness over her left breast and left chest wall, warmth of skin over her left breast and chest wall

## 2019-05-16 NOTE — CONSULT NOTE ADULT - SUBJECTIVE AND OBJECTIVE BOX
Attending: Dr Camarillo      HPI:  43 F w/ PMH of invasive ductal ca in-situ (ER, [place] negative, Her2 positive), s/p resection , followed by chemotherapy, on maintenance hormonal therapy, with questionable mets to C-spine and s/p radiation, HTN, admitted to medical services with lt breast fungating mass. Thoracic sx called to evaluate pt, pt seen and examined at bedside,  present at bedside aiding in translation. Pt admits to left breast pain and mass for the past 2 weeks, admits to exertional SOB, no SOB at rest, denies CP, no difficulties breathing, offers no other complaints.       PAST MEDICAL & SURGICAL HISTORY:  Breast CA  History of hypertension  S/P appendectomy  H/O:   S/P cholecystectomy      MEDICATIONS  (STANDING):  amLODIPine   Tablet 5 milliGRAM(s) Oral daily  cefepime   IVPB 1000 milliGRAM(s) IV Intermittent every 8 hours  cefepime   IVPB      enoxaparin Injectable 40 milliGRAM(s) SubCutaneous daily  potassium chloride  10 mEq/100 mL IVPB 10 milliEquivalent(s) IV Intermittent every 1 hour    MEDICATIONS  (PRN):  acetaminophen   Tablet .. 650 milliGRAM(s) Oral every 6 hours PRN Temp greater or equal to 38C (100.4F), Mild Pain (1 - 3)  ALBUTerol/ipratropium for Nebulization. 3 milliLiter(s) Nebulizer every 6 hours PRN Shortness of Breath and/or Wheezing      Allergies    penicillin (Pruritus; Rash)    Intolerances        SOCIAL HISTORY:  No ETOH use, no smoking hx   FAMILY HISTORY:  No pertinent family history in first degree relatives      Vital Signs Last 24 Hrs  T(C): 37.3 (16 May 2019 07:55), Max: 37.9 (15 May 2019 19:28)  T(F): 99.2 (16 May 2019 07:55), Max: 100.2 (15 May 2019 19:28)  HR: 98 (16 May 2019 07:55) (94 - 110)  BP: 150/98 (16 May 2019 07:55) (150/98 - 174/107)  BP(mean): --  RR: 18 (16 May 2019 07:55) (18 - 20)  SpO2: 98% (16 May 2019 07:55) (94% - 98%)    I&O's Summary      LABS:                        14.3   7.01  )-----------( 221      ( 16 May 2019 10:41 )             42.8     -    138  |  107  |  4<L>  ----------------------------<  93  3.1<L>   |  24  |  0.51    Ca    7.5<L>      16 May 2019 10:41  Phos  2.6       Mg     2.1         TPro  6.4  /  Alb  2.9<L>  /  TBili  0.8  /  DBili  x   /  AST  124<H>  /  ALT  91<H>  /  AlkPhos  98  -        CAPILLARY BLOOD GLUCOSE    LIVER FUNCTIONS - ( 16 May 2019 10:41 )  Alb: 2.9 g/dL / Pro: 6.4 g/dL / ALK PHOS: 98 U/L / ALT: 91 U/L DA / AST: 124 U/L / GGT: x               RADIOLOGY & ADDITIONAL STUDIES:  < from: CT Chest w/ IV Cont (05.15.19 @ 23:55) >  FINDINGS:    LUNGS AND AIRWAYS AND PLEURA: Patent central airways.  Large left pleural   effusion with complete collapse of the left lower lobe and near complete   collapse of the left upper lobe. Calcified granuloma in the collapsed   left lower lobe. Smooth interlobular septal thickening at the right lung   base. Trace right pleural effusion.    MEDIASTINUM ANDHILA: Few mildly enlarged left paratracheal lymph nodes   measuring up to 1.3 cm. Shift of the mediastinum towards the right   secondary to the large left pleural effusion.    VESSELS: Within normal limits.    HEART: Heart size is normal. Trace pericardial fluid.    CHEST WALL AND LOWER NECK: Marked masslike thickening of the outer lower   quadrant of the left breast with underlying confluent breast mass   measuring up to 4.8 x 3.6 cm.    VISUALIZED UPPER ABDOMEN: Hepatic steatosis. Intrahepatic and extra   hepatic biliary ductal dilatation in the setting of a cholecystectomy.    BONES: Several sclerotic lesions within the spine most likely   representing bony metastatic disease. Mild compression of the T3 and T10   vertebral bodies and moderate compression of the T7 vertebral body likely   representing pathologic compression deformities.    IMPRESSION:     Marked masslike thickening of the outer lower quadrant of the left breast   with underlying confluent breast mass measuring up to 4.8x 3.6 cm   compatible with carcinoma.    Large left pleural effusion with complete collapse of the left lower lobe   and near complete collapse of the left upper lobe, presumably malignant   effusion.    Smooth interlobular septal thickening at the right lung base likely   reflective of mild interstitial edema. Trace right pleural effusion.    Few mildly enlarged left paratracheal lymph nodes measuring up to 1.3 cm.    Several sclerotic lesions within the spine most likely representing bony   metastatic disease. Compression deformities of T3, T7, and T10 likely   representing pathologic compression deformities.    Hepatic steatosis. Intrahepatic and extra hepatic biliary ductal   dilatation in the setting of a cholecystectomy.    < end of copied text >

## 2019-05-16 NOTE — H&P ADULT - PROBLEM SELECTOR PROBLEM 3
Reason For Visit  SERVANDO CRAWFORD is a(n) established patient here today for a chief complaint of cold.   Patient accompanied by mother .      Quality    Adult Wellness CI height documented, no discussion of regular exercise, not exercising regularly, no printed information given for activities, no discussion of nutritional quality of diet, no patient education given about proper diet, not using alcohol, not having considered quitting drinking, not getting angry when talked to about drinking, not having a drink or two in the morning to get going, not drinking alcohol regularly, and feeling guilty about it, no tobacco use, did not provide intervention and counseling in regards to tobacco use, does not have feelings of hopelessness (PHQ-2), no Anhedonia (PHQ-2), not referred to local mental health center, not taking medication for depression and no monitoring patient.   Smoking Cessation CI no tobacco use and did not provide intervention and counseling in regards to tobacco use.      History of Present Illness    14 year old female here for cold and cough for x2days.    Patient is here with her mother who is also here for a sick visit. They have a few family members who are ill right now.       Primary complaint(s): unchanged lasting 2 day(s). Symptoms include productive cough, nasal congestion and nasal discharge, but no fever, no wheezing, no shortness of breath, no ear pain, no ear plugging, no sore throat, no nausea, no vomiting and no diarrhea.   Current Treatment: she is currently not being treated for this problem.      Review of Systems    All other systems reviewed and negative.      Allergies  No Known Drug Allergies    Current Meds  Allegra-YOBAIN TB12; 1 po qd;  Therapy: (Recorded:[id number]) to Recorded  Methylphenidate HCl ER 18 MG Oral Tablet Extended Release;  Therapy: [id number] to Recorded  Ritalin 10 MG Oral Tablet; 1 po QD prn;  Therapy: [id number] to Recorded    Active Problems  Allergic rhinitis,  unspecified allergic rhinitis type  Viral URI with cough (J06.9,B97.89)    Past Medical History    past medical history was reviewed.      Family History  Family medical history was reviewed.      Social History  She lives with her mother and father.      Review  Past medical history, problem list, family medical history, surgical history and social history reviewed.      Vitals  Vital Signs    Recorded: 27Nov2018 01:36PM   Height 5 ft 2 in   Weight 115 lb 2 oz   BMI Calculated 21.06   BSA Calculated 1.51   BMI Percentile 55   2-20 Stature Percentile 21 %   2-20 Weight Percentile 40 %   Systolic 118, RUE, Sitting   Diastolic 74, RUE, Sitting   Temperature 97.9 F, Temporal   Heart Rate 96   Respiration 18   O2 Saturation 100     Physical Exam  Constitutional: well developed, well nourished, in no acute distress, interactive, current vital signs reviewed and at baseline for patient based on underlying medical diagnoses.   Head and Face: atraumatic, no deformities, normocephalic, normal facies. no tenderness of facial sinuses.   Eyes: no discharge and normal conjunctiva.   ENT: normal appearing outer ear and normal appearing nose. examination of the tympanic membrane showed normal landmarks and normal appearing external canal. nasal mucosa moist and pink and no nasal discharge. normal lips. oral mucosa pink and moist, no oral lesions, normal appearing pharynx and normal appearing tongue . Patient's throat throat is mildly red.   Lymphatic: no lymphadenopathy.   Pulmonary: no respiratory distress, normal respiratory rate and effort and no accessory muscle use. breath sounds clear to auscultation bilaterally.   Cardiovascular: normal rate, no murmurs were heard, regular rhythm, normal S1 and normal S2.      Assessment   1. Viral URI with cough (J06.9,B97.89)    Plan    Patient appears to have a viral illness. No antibiotics are prescribed at this time. She needs to get plenty of rest, fluids. If she has pain she can take  Tylenol. No school for today. She will need to see how she is feeling tomorrow. If she becomes worse they need to contact the office.   Return to clinic for persistent or worsening symptoms.      Signatures   Electronically signed by : Andie Ku CMA; Nov 27 2018  1:41PM CST    Electronically signed by : LA CASTELLON; Nov 29 2018  7:04PM REYMUNDO     Pleural effusion on left

## 2019-05-16 NOTE — CONSULT NOTE ADULT - SUBJECTIVE AND OBJECTIVE BOX
42 y/o F with past hx of Breast CA and Brain CA (was off chemo for 4 years, but started again after her brain CA started) and HTN presents to the ED c/o L breast pain. Pt notes that 3 years ago, she had surgery for L breast CA (Dr. Navarrete, Oncology: Dr. Augie Brantley, [phone]) and now is extremely swollen and has a lot of pain. Pt states that her breast has been swollen for approximately 1-2 weeks. She also notes of redness to the area. Pt denies fevers or nipple discharge. Pt is on memantine, Keppra, and amlodipine. She is allergic to penicillin (hives).   ID: [id number].      PAST MEDICAL & SURGICAL HISTORY:  History of hypertension  S/P cholecystectomy      MEDICATIONS  (STANDING):    MEDICATIONS  (PRN):      Allergies    penicillin (Pruritus; Rash)    Intolerances        SOCIAL HISTORY:  No drug use    FAMILY HISTORY:  No pertinent family history in first degree relatives    Father without Cardiac history.    REVIEW OF SYSTEMS:  CONSTITUTIONAL: In no acute distress.        Vital Signs Last 24 Hrs  T(C): 37.3 (16 May 2019 00:20), Max: 37.9 (15 May 2019 19:28)  T(F): 99.2 (16 May 2019 00:20), Max: 100.2 (15 May 2019 19:28)  HR: 96 (16 May 2019 00:20) (96 - 110)  BP: 159/97 (16 May 2019 00:20) (159/97 - 174/107)  BP(mean): --  RR: 18 (16 May 2019 00:20) (18 - 20)  SpO2: 96% (16 May 2019 00:20) (96% - 96%)    Daily Height in cm: 162.56 (15 May 2019 19:28)    Daily     PHYSICAL EXAM:  CONSTITIONAL/GENERAL: NAD, well-groomed, well-developed  HEAD:  Atraumatic, Normocephalic  VISUAL/EYES: EOMI, PERRL, conjunctiva and sclera clear  ENMT: Moist mucous membranes, Good dentition, No lesions  NECK: Supple, No JVD  NERVOUS SYSTEM:  Alert & Oriented X3, Good concentration  RESPIRATORY/CHEST: Clear to percussion bilaterally; Normal respiratory effort  CARDIOVASCULAR/HEART: Regular rate and rhythm  GASTROINTESTINAL/ABDOMEN: Soft, Nontender, Nondistended; Bowel sounds present  GENITOURINARY: normal external genitalia  BREASTS: Left breast mass, redness  MUSCULOSKELETAL/EXTREMITIES:  No clubbing, cyanosis, or edema  VASCULAR: equal peripheral pulses  LYMPHATIC: No lymphadenopathy noted  SKIN: No rashes or lesions  PSYCHIATRIC: normal affect      LABS:                        14.8   6.68  )-----------( 227      ( 15 May 2019 22:36 )             44.2     Neutrophil %:   05-15    139  |  108  |  6<L>  ----------------------------<  80  3.2<L>   |  23  |  0.60    Ca    7.9<L>      15 May 2019 22:36    TPro  6.8  /  Alb  3.0<L>  /  TBili  0.8  /  DBili  x   /  AST  91<H>  /  ALT  68<H>  /  AlkPhos  77  05-15      RADIOLOGY & ADDITIONAL STUDIES:  IMPRESSION:     Marked masslike thickening of the outer lower quadrant of the left breast   with underlying confluent breast mass measuring up to 4.8x 3.6 cm   compatible with carcinoma.    Large left pleural effusion with complete collapse of the left lower lobe   and near complete collapse of the left upper lobe, presumably malignant   effusion.    Smooth interlobular septal thickening at the right lung base likely   reflective of mild interstitial edema. Trace right pleural effusion.    Few mildly enlarged left paratracheal lymph nodes measuring up to 1.3 cm.    Several sclerotic lesions within the spine most likely representing bony   metastatic disease. Compression deformities of T3, T7, and T10 likely   representing pathologic compression deformities.    Hepatic steatosis. Intrahepatic and extra hepatic biliary ductal   dilatation in the setting of a cholecystectomy. 44 y/o F with past hx of Breast CA and Brain CA (was off chemo for 4 years, but started again after her brain CA started) and HTN presents to the ED c/o L breast pain. Pt notes that 3 years ago, she had surgery for L breast CA (Dr. Navarrete, Oncology: Dr. Augie Brantley, [phone]) and now is extremely swollen and has a lot of pain. Pt states that her breast has been swollen for approximately 1-2 weeks. She also notes of redness to the area. Pt denies fevers or nipple discharge. Pt is on memantine, Keppra, and amlodipine. She is allergic to penicillin (hives).   ID: [id number].      PAST MEDICAL & SURGICAL HISTORY:  History of hypertension  S/P cholecystectomy      MEDICATIONS  (STANDING):    MEDICATIONS  (PRN):      Allergies    penicillin (Pruritus; Rash)    Intolerances        SOCIAL HISTORY:  No drug use    FAMILY HISTORY:  No pertinent family history in first degree relatives    Father without Cardiac history.    REVIEW OF SYSTEMS:  CONSTITUTIONAL: In no acute distress.        Vital Signs Last 24 Hrs  T(C): 37.3 (16 May 2019 00:20), Max: 37.9 (15 May 2019 19:28)  T(F): 99.2 (16 May 2019 00:20), Max: 100.2 (15 May 2019 19:28)  HR: 96 (16 May 2019 00:20) (96 - 110)  BP: 159/97 (16 May 2019 00:20) (159/97 - 174/107)  BP(mean): --  RR: 18 (16 May 2019 00:20) (18 - 20)  SpO2: 96% (16 May 2019 00:20) (96% - 96%)    Daily Height in cm: 162.56 (15 May 2019 19:28)    Daily     PHYSICAL EXAM:  CONSTITIONAL/GENERAL: NAD, well-groomed, well-developed  HEAD:  Atraumatic, Normocephalic  VISUAL/EYES: EOMI, PERRL, conjunctiva and sclera clear  ENMT: Moist mucous membranes, Good dentition, No lesions  NECK: Supple, No JVD  NERVOUS SYSTEM:  Alert & Oriented X3, Good concentration  RESPIRATORY/CHEST: Clear to percussion bilaterally; Normal respiratory effort  CARDIOVASCULAR/HEART: Regular rate and rhythm  GASTROINTESTINAL/ABDOMEN: Soft, Nontender, Nondistended; Bowel sounds present  GENITOURINARY: normal external genitalia  BREASTS: Left breast mass, fungating, redness,   MUSCULOSKELETAL/EXTREMITIES:  No clubbing, cyanosis, or edema  VASCULAR: equal peripheral pulses  LYMPHATIC: No lymphadenopathy noted  SKIN: No rashes or lesions  PSYCHIATRIC: normal affect      LABS:                        14.8   6.68  )-----------( 227      ( 15 May 2019 22:36 )             44.2     Neutrophil %:   05-15    139  |  108  |  6<L>  ----------------------------<  80  3.2<L>   |  23  |  0.60    Ca    7.9<L>      15 May 2019 22:36    TPro  6.8  /  Alb  3.0<L>  /  TBili  0.8  /  DBili  x   /  AST  91<H>  /  ALT  68<H>  /  AlkPhos  77  05-15      RADIOLOGY & ADDITIONAL STUDIES:  IMPRESSION:     Marked masslike thickening of the outer lower quadrant of the left breast   with underlying confluent breast mass measuring up to 4.8x 3.6 cm   compatible with carcinoma.    Large left pleural effusion with complete collapse of the left lower lobe   and near complete collapse of the left upper lobe, presumably malignant   effusion.    Smooth interlobular septal thickening at the right lung base likely   reflective of mild interstitial edema. Trace right pleural effusion.    Few mildly enlarged left paratracheal lymph nodes measuring up to 1.3 cm.    Several sclerotic lesions within the spine most likely representing bony   metastatic disease. Compression deformities of T3, T7, and T10 likely   representing pathologic compression deformities.    Hepatic steatosis. Intrahepatic and extra hepatic biliary ductal   dilatation in the setting of a cholecystectomy.

## 2019-05-16 NOTE — CONSULT NOTE ADULT - NEUROLOGICAL DETAILS
sensation intact/responds to pain/responds to verbal commands/alert and oriented x 3 alert and oriented x 3

## 2019-05-16 NOTE — CONSULT NOTE ADULT - ASSESSMENT
Left breast cellulitis  Low grade fever    Plan - dc maxipime  start clindamycin 600mgs iv q8hrs  had a reaction to vanco earlier but looks like red man syndrome.

## 2019-05-17 ENCOUNTER — RESULT REVIEW (OUTPATIENT)
Age: 44
End: 2019-05-17

## 2019-05-17 LAB
ANION GAP SERPL CALC-SCNC: 8 MMOL/L — SIGNIFICANT CHANGE UP (ref 5–17)
APTT BLD: 36.7 SEC — HIGH (ref 27.5–36.3)
B PERT IGG+IGM PNL SER: ABNORMAL
BUN SERPL-MCNC: 5 MG/DL — LOW (ref 7–18)
CALCIUM SERPL-MCNC: 7.9 MG/DL — LOW (ref 8.4–10.5)
CHLORIDE SERPL-SCNC: 108 MMOL/L — SIGNIFICANT CHANGE UP (ref 96–108)
CO2 SERPL-SCNC: 22 MMOL/L — SIGNIFICANT CHANGE UP (ref 22–31)
COLOR FLD: SIGNIFICANT CHANGE UP
CREAT SERPL-MCNC: 0.58 MG/DL — SIGNIFICANT CHANGE UP (ref 0.5–1.3)
FLUID INTAKE SUBSTANCE CLASS: SIGNIFICANT CHANGE UP
FLUID SEGMENTED GRANULOCYTES: 3 % — SIGNIFICANT CHANGE UP
GLUCOSE SERPL-MCNC: 89 MG/DL — SIGNIFICANT CHANGE UP (ref 70–99)
HCT VFR BLD CALC: 44.6 % — SIGNIFICANT CHANGE UP (ref 34.5–45)
HGB BLD-MCNC: 15.2 G/DL — SIGNIFICANT CHANGE UP (ref 11.5–15.5)
INR BLD: 1.14 RATIO — SIGNIFICANT CHANGE UP (ref 0.88–1.16)
LYMPHOCYTES # FLD: 80 % — SIGNIFICANT CHANGE UP
MCHC RBC-ENTMCNC: 31.3 PG — SIGNIFICANT CHANGE UP (ref 27–34)
MCHC RBC-ENTMCNC: 34.1 GM/DL — SIGNIFICANT CHANGE UP (ref 32–36)
MCV RBC AUTO: 91.8 FL — SIGNIFICANT CHANGE UP (ref 80–100)
MESOTHL CELL # FLD: 8 % — SIGNIFICANT CHANGE UP
MONOS+MACROS # FLD: 5 % — SIGNIFICANT CHANGE UP
NRBC # BLD: 0 /100 WBCS — SIGNIFICANT CHANGE UP (ref 0–0)
OTHER CELLS FLD MANUAL: 4 % — SIGNIFICANT CHANGE UP
PH FLD: 7.7 — SIGNIFICANT CHANGE UP
PLATELET # BLD AUTO: 243 K/UL — SIGNIFICANT CHANGE UP (ref 150–400)
POTASSIUM SERPL-MCNC: 3.4 MMOL/L — LOW (ref 3.5–5.3)
POTASSIUM SERPL-SCNC: 3.4 MMOL/L — LOW (ref 3.5–5.3)
PROTHROM AB SERPL-ACNC: 12.7 SEC — SIGNIFICANT CHANGE UP (ref 10–12.9)
RBC # BLD: 4.86 M/UL — SIGNIFICANT CHANGE UP (ref 3.8–5.2)
RBC # FLD: 13 % — SIGNIFICANT CHANGE UP (ref 10.3–14.5)
RCV VOL RI: HIGH /UL (ref 0–5)
SODIUM SERPL-SCNC: 138 MMOL/L — SIGNIFICANT CHANGE UP (ref 135–145)
TOTAL NUCLEATED CELL COUNT, BODY FLUID: 950 /UL — HIGH (ref 0–5)
TUBE TYPE: SIGNIFICANT CHANGE UP
WBC # BLD: 6.62 K/UL — SIGNIFICANT CHANGE UP (ref 3.8–10.5)
WBC # FLD AUTO: 6.62 K/UL — SIGNIFICANT CHANGE UP (ref 3.8–10.5)

## 2019-05-17 PROCEDURE — 88305 TISSUE EXAM BY PATHOLOGIST: CPT | Mod: 26

## 2019-05-17 PROCEDURE — 88360 TUMOR IMMUNOHISTOCHEM/MANUAL: CPT | Mod: 26,59

## 2019-05-17 PROCEDURE — 88342 IMHCHEM/IMCYTCHM 1ST ANTB: CPT | Mod: 26

## 2019-05-17 PROCEDURE — 71045 X-RAY EXAM CHEST 1 VIEW: CPT | Mod: 26

## 2019-05-17 PROCEDURE — 88112 CYTOPATH CELL ENHANCE TECH: CPT | Mod: 26

## 2019-05-17 PROCEDURE — 88341 IMHCHEM/IMCYTCHM EA ADD ANTB: CPT | Mod: 26

## 2019-05-17 RX ADMIN — Medication 100 MILLIGRAM(S): at 06:08

## 2019-05-17 RX ADMIN — ENOXAPARIN SODIUM 40 MILLIGRAM(S): 100 INJECTION SUBCUTANEOUS at 15:21

## 2019-05-17 RX ADMIN — Medication 100 MILLIGRAM(S): at 13:27

## 2019-05-17 RX ADMIN — Medication 100 MILLIGRAM(S): at 21:57

## 2019-05-17 RX ADMIN — AMLODIPINE BESYLATE 5 MILLIGRAM(S): 2.5 TABLET ORAL at 06:08

## 2019-05-17 NOTE — PROGRESS NOTE ADULT - SUBJECTIVE AND OBJECTIVE BOX
43y Female admitted for breast mass. Patient  is at bedside. Patient is feeling fine, no shortness of breath. Surgery team planning surgery on Monday after thoracentesis (might be today) as per patient.     Meds:  clindamycin IVPB      clindamycin IVPB 600 milliGRAM(s) IV Intermittent every 8 hours    Allergies    penicillin (Pruritus; Rash)    Intolerances        VITALS:  Vital Signs Last 24 Hrs  T(C): 37.4 (17 May 2019 05:40), Max: 37.4 (17 May 2019 05:40)  T(F): 99.3 (17 May 2019 05:40), Max: 99.3 (17 May 2019 05:40)  HR: 105 (17 May 2019 05:40) (105 - 111)  BP: 154/105 (17 May 2019 05:40) (137/89 - 154/105)  BP(mean): --  RR: 16 (17 May 2019 05:40) (15 - 18)  SpO2: 95% (17 May 2019 05:40) (92% - 96%)    LABS/DIAGNOSTIC TESTS:                          15.2   6.62  )-----------( 243      ( 17 May 2019 08:13 )             44.6         05-17    138  |  108  |  5<L>  ----------------------------<  89  3.4<L>   |  22  |  0.58    Ca    7.9<L>      17 May 2019 08:13  Phos  2.6     05-16  Mg     2.1     05-16    TPro  6.4  /  Alb  2.9<L>  /  TBili  0.8  /  DBili  x   /  AST  124<H>  /  ALT  91<H>  /  AlkPhos  98  05-16      LIVER FUNCTIONS - ( 16 May 2019 10:41 )  Alb: 2.9 g/dL / Pro: 6.4 g/dL / ALK PHOS: 98 U/L / ALT: 91 U/L DA / AST: 124 U/L / GGT: x             CULTURES:       RADIOLOGY:      ROS:  [  ] UNABLE TO ELICIT 43y Female admitted for breast mass. Patient  is at bedside. Patient is feeling fine, no shortness of breath. Surgery team planning surgery on Monday after thoracentesis. Pt got her thoracentesis and has a pigtail catheter on left side. she c/o left breast pain still. she has no fevers or chills.    Meds:  clindamycin IVPB      clindamycin IVPB 600 milliGRAM(s) IV Intermittent every 8 hours    Allergies    penicillin (Pruritus; Rash)    Intolerances        VITALS:  Vital Signs Last 24 Hrs  T(C): 37.4 (17 May 2019 05:40), Max: 37.4 (17 May 2019 05:40)  T(F): 99.3 (17 May 2019 05:40), Max: 99.3 (17 May 2019 05:40)  HR: 105 (17 May 2019 05:40) (105 - 111)  BP: 154/105 (17 May 2019 05:40) (137/89 - 154/105)  BP(mean): --  RR: 16 (17 May 2019 05:40) (15 - 18)  SpO2: 95% (17 May 2019 05:40) (92% - 96%)    LABS/DIAGNOSTIC TESTS:                          15.2   6.62  )-----------( 243      ( 17 May 2019 08:13 )             44.6         05-17    138  |  108  |  5<L>  ----------------------------<  89  3.4<L>   |  22  |  0.58    Ca    7.9<L>      17 May 2019 08:13  Phos  2.6     05-16  Mg     2.1     05-16    TPro  6.4  /  Alb  2.9<L>  /  TBili  0.8  /  DBili  x   /  AST  124<H>  /  ALT  91<H>  /  AlkPhos  98  05-16      LIVER FUNCTIONS - ( 16 May 2019 10:41 )  Alb: 2.9 g/dL / Pro: 6.4 g/dL / ALK PHOS: 98 U/L / ALT: 91 U/L DA / AST: 124 U/L / GGT: x             CULTURES:       RADIOLOGY:      ROS:  [  ] UNABLE TO ELICIT

## 2019-05-17 NOTE — PROGRESS NOTE ADULT - ASSESSMENT
Chest wall /left breast cellulitis resolved mostly    plan - cont clindamycin 600mg iv q8hrs till Monday post op then dc all abxs  reconsult prn.

## 2019-05-17 NOTE — PROGRESS NOTE ADULT - SUBJECTIVE AND OBJECTIVE BOX
Patient examined at bedside, no overnight events, denies shortness of breath  No nausea, no vomiting    T(C): 37.4 (05-17-19 @ 05:40), Max: 37.4 (05-17-19 @ 05:40)  HR: 105 (05-17-19 @ 05:40) (105 - 111)  BP: 154/105 (05-17-19 @ 05:40) (137/89 - 154/105)  RR: 16 (05-17-19 @ 05:40) (15 - 18)  SpO2: 95% (05-17-19 @ 05:40) (92% - 96%)  Wt(kg): -      Physical Exam  General: AAOx3, No acute distress  Skin: No jaundice, no icterus  Abdomen: soft, nontender, nondistended  Extremities: non edematous, no calf pain bilaterally                          15.2   6.62  )-----------( 243      ( 17 May 2019 08:13 )             44.6   05-17    138  |  108  |  5<L>  ----------------------------<  89  3.4<L>   |  22  |  0.58    Ca    7.9<L>      17 May 2019 08:13  Phos  2.6     05-16  Mg     2.1     05-16    TPro  6.4  /  Alb  2.9<L>  /  TBili  0.8  /  DBili  x   /  AST  124<H>  /  ALT  91<H>  /  AlkPhos  98  05-16 Clothing

## 2019-05-17 NOTE — ED ADULT NURSE NOTE - NSIMPLEMENTINTERV_GEN_ALL_ED
Implemented All Universal Safety Interventions:  [place] to call system. Call bell, personal items and telephone within reach. Instruct patient to call for assistance. Room bathroom lighting operational. Non-slip footwear when patient is off stretcher. Physically safe environment: no spills, clutter or unnecessary equipment. Stretcher in lowest position, wheels locked, appropriate side rails in place.

## 2019-05-17 NOTE — PROGRESS NOTE ADULT - ASSESSMENT
1. Pleural Effusion  - Large Left pleural effusion with complete collapse of LLL and near complete collapse of NARCISO.   - R/O malignant effusion  - Will need Thoracocentesis - planning to be completed by IR with possible Pleurex.   - Bronchodilators  - O2 Supplement  - F/U XR.     2. Metastatic Breast CA   - Now with left breast fungating mass   - Planning for surgical removal.   - S/P chemo  - On hormonal therapy  - Spine Mets  - S/P radiation  - Heme/onc eval  - Surgical eval noted.   - ID eval noted.    - Abx for cellulitis of left breast   - Pain control.   - DVT and GI PPX

## 2019-05-17 NOTE — PROGRESS NOTE ADULT - ASSESSMENT
44yo female with left pleural effusion likely malignant, left breast cancer    1) recommend thoracentiesis for pleural effusion  2) O2 nasal canula as needed  3) oncology f/u  4) thoracic will follow  5) dvt prophylaxis

## 2019-05-17 NOTE — PROGRESS NOTE ADULT - SUBJECTIVE AND OBJECTIVE BOX
S: patient seen this morning.  at bedside.  Mentions about left breast pain.  No other complaints.  She feels well otherwise.    T(C): 37.6 (17 May 2019 14:08), Max: 37.6 (17 May 2019 14:08)  T(F): 99.7 (17 May 2019 14:08), Max: 99.7 (17 May 2019 14:08)  HR: 103 (17 May 2019 14:08) (103 - 111)  BP: 136/100 (17 May 2019 14:08) (136/100 - 154/105)  BP(mean): --  ABP: --  ABP(mean): --  RR: 16 (17 May 2019 14:08) (15 - 18)  SpO2: 96% (17 May 2019 14:08) (92% - 96%)    Gen: alert and oriented 3.  No acute distress.  CVS: S1-S2  RESP: CT ABL.  GI: soft, nontender.  Breasts: left breast swollen, tender.  No bloody or ulcerative discharge.    CBC Full  -  ( 17 May 2019 08:13 )  WBC Count : 6.62 K/uL  RBC Count : 4.86 M/uL  Hemoglobin : 15.2 g/dL  Hematocrit : 44.6 %  Platelet Count - Automated : 243 K/uL  Mean Cell Volume : 91.8 fl  Mean Cell Hemoglobin : 31.3 pg  Mean Cell Hemoglobin Concentration : 34.1 gm/dL  Auto Neutrophil # : x  Auto Lymphocyte # : x  Auto Monocyte # : x  Auto Eosinophil # : x  Auto Basophil # : x  Auto Neutrophil % : x  Auto Lymphocyte % : x  Auto Monocyte % : x  Auto Eosinophil % : x  Auto Basophil % : x

## 2019-05-17 NOTE — PROGRESS NOTE ADULT - RS GEN PE MLT RESP DETAILS PC
respirations non-labored/no rhonchi/no rales/airway patent no rales/respirations non-labored/no wheezes/good air movement/no rhonchi

## 2019-05-17 NOTE — PROGRESS NOTE ADULT - SUBJECTIVE AND OBJECTIVE BOX
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 Patient had sudden onset pain and then fast growing mass and swelling with involvement of overlying skin. Denies any fever, chills, nausea, vomiting , abdominal pain, nipple discharge.   Admits to have exertional sob. Patient is being followed up on op basis by  who did surgery and Dr. Brantley.       pt seen in icu [  ], reg med floor [ x  ], bed [x  ], chair at bedside [   ], a+o x3 [ x ], lethargic [  ],  nad [ x ]          Allergies    penicillin (Pruritus; Rash)        Vitals    T(F): 99.3 (05-17-19 @ 05:40), Max: 99.3 (05-17-19 @ 05:40)  HR: 105 (05-17-19 @ 05:40) (105 - 111)  BP: 154/105 (05-17-19 @ 05:40) (137/89 - 154/105)  RR: 16 (05-17-19 @ 05:40) (15 - 18)  SpO2: 95% (05-17-19 @ 05:40) (92% - 96%)  Wt(kg): --  CAPILLARY BLOOD GLUCOSE          Labs                          15.2   6.62  )-----------( 243      ( 17 May 2019 08:13 )             44.6       05-17    138  |  108  |  5<L>  ----------------------------<  89  3.4<L>   |  22  |  0.58    Ca    7.9<L>      17 May 2019 08:13  Phos  2.6     05-16  Mg     2.1     05-16    TPro  6.4  /  Alb  2.9<L>  /  TBili  0.8  /  DBili  x   /  AST  124<H>  /  ALT  91<H>  /  AlkPhos  98  05-16                Radiology Results  < from: CT Chest w/ IV Cont (05.15.19 @ 23:55) >  IMPRESSION:     Marked masslike thickening of the outer lower quadrant of the left breast   with underlying confluent breast mass measuring up to 4.8x 3.6 cm   compatible with carcinoma.    Large left pleural effusion with complete collapse of the left lower lobe   and near complete collapse of the left upper lobe, presumably malignant   effusion.    Smooth interlobular septal thickening at the right lung base likely   reflective of mild interstitial edema. Trace right pleural effusion.    Few mildly enlarged left paratracheal lymph nodes measuring up to 1.3 cm.    Several sclerotic lesions within the spine most likely representing bony   metastatic disease. Compression deformities of T3, T7, and T10 likely   representing pathologic compression deformities.    Hepatic steatosis. Intrahepatic and extra hepatic biliary ductal   dilatation in the setting of a cholecystectomy.    < end of copied text >          Meds    MEDICATIONS  (STANDING):  amLODIPine   Tablet 5 milliGRAM(s) Oral daily  clindamycin IVPB      clindamycin IVPB 600 milliGRAM(s) IV Intermittent every 8 hours  enoxaparin Injectable 40 milliGRAM(s) SubCutaneous daily      MEDICATIONS  (PRN):  acetaminophen   Tablet .. 650 milliGRAM(s) Oral every 6 hours PRN Temp greater or equal to 38C (100.4F), Mild Pain (1 - 3)  ALBUTerol/ipratropium for Nebulization. 3 milliLiter(s) Nebulizer every 6 hours PRN Shortness of Breath and/or Wheezing      Physical Exam    Neuro :  no focal deficits  Respiratory: CTA B/L  CV: RRR, S1S2, no murmurs,   Abdominal: Soft, NT, ND +BS,  Extremities: No edema, + peripheral pulses    ASSESSMENT     left breast cellulitis,   r/o recurrence of breast ca with mets,   left pleural eff likely malignant,   uncontrolled bp poss 2nd to pain,   h/o lung mets,   h/o nvasive ductal ca insitu( ER, ME negative, Her2 positive),   s/p surgery in 2015, followed by chemotherapy, on maintainance hormonal therapy,   with questionable mets to C-spine and s/p radiation,   HTN      PLAN      pain control,   gi and dvt profilaxis,   supplement potassium for hypokalemia  thoracic surg cons noted   Rec thoracocentesis of lt pleural effusion, pt not an ideal candidate for PleurX or Pigtail catheter given close proximity to fungating mass/ affected area (possibility of co- infection) and planned excision by Gen surgery w/ possible Plastic sx involvement     gen surg cons noted   pulm cons noted   id cons noted   dc maxipime  start clindamycin 600mgs iv q8hrs  had a reaction to vanco earlier but looks like red man syndrome.    heme-onc cons noted   no plan to continue with systemic palliative chemotherapy during the inpt coures  continue current meds Patient is a 43y old  Female who presents with a chief complaint of Left breast pain and swelling (16 May 2019 18:17)  History of Present Illness: 	  43 female with PMH of invasive ductal ca insitu( ER, PA negative, Her2 positive), s/p surgery in 2015, followed by chemotherapy, on maintainance hormonal therapy, with questionable mets to C-spine and s/p radiation, HTN, comes in with complaint of Left breast pain and mass *2 weeks. Patient had sudden onset pain and then fast growing mass and swelling with involvement of overlying skin. Denies any fever, chills, nausea, vomiting , abdominal pain, nipple discharge.   Admits to have exertional sob. Patient is being followed up on op basis by  who did surgery and Dr. Brantley.       pt seen in icu [  ], reg med floor [ x  ], bed [x  ], chair at bedside [   ], a+o x3 [ x ], lethargic [  ],  nad [ x ]          Allergies    penicillin (Pruritus; Rash)        Vitals    T(F): 99.3 (05-17-19 @ 05:40), Max: 99.3 (05-17-19 @ 05:40)  HR: 105 (05-17-19 @ 05:40) (105 - 111)  BP: 154/105 (05-17-19 @ 05:40) (137/89 - 154/105)  RR: 16 (05-17-19 @ 05:40) (15 - 18)  SpO2: 95% (05-17-19 @ 05:40) (92% - 96%)  Wt(kg): --  CAPILLARY BLOOD GLUCOSE          Labs                          15.2   6.62  )-----------( 243      ( 17 May 2019 08:13 )             44.6       05-17    138  |  108  |  5<L>  ----------------------------<  89  3.4<L>   |  22  |  0.58    Ca    7.9<L>      17 May 2019 08:13  Phos  2.6     05-16  Mg     2.1     05-16    TPro  6.4  /  Alb  2.9<L>  /  TBili  0.8  /  DBili  x   /  AST  124<H>  /  ALT  91<H>  /  AlkPhos  98  05-16                Radiology Results  < from: CT Chest w/ IV Cont (05.15.19 @ 23:55) >  IMPRESSION:     Marked masslike thickening of the outer lower quadrant of the left breast   with underlying confluent breast mass measuring up to 4.8x 3.6 cm   compatible with carcinoma.    Large left pleural effusion with complete collapse of the left lower lobe   and near complete collapse of the left upper lobe, presumably malignant   effusion.    Smooth interlobular septal thickening at the right lung base likely   reflective of mild interstitial edema. Trace right pleural effusion.    Few mildly enlarged left paratracheal lymph nodes measuring up to 1.3 cm.    Several sclerotic lesions within the spine most likely representing bony   metastatic disease. Compression deformities of T3, T7, and T10 likely   representing pathologic compression deformities.    Hepatic steatosis. Intrahepatic and extra hepatic biliary ductal   dilatation in the setting of a cholecystectomy.    < end of copied text >          Meds    MEDICATIONS  (STANDING):  amLODIPine   Tablet 5 milliGRAM(s) Oral daily  clindamycin IVPB      clindamycin IVPB 600 milliGRAM(s) IV Intermittent every 8 hours  enoxaparin Injectable 40 milliGRAM(s) SubCutaneous daily      MEDICATIONS  (PRN):  acetaminophen   Tablet .. 650 milliGRAM(s) Oral every 6 hours PRN Temp greater or equal to 38C (100.4F), Mild Pain (1 - 3)  ALBUTerol/ipratropium for Nebulization. 3 milliLiter(s) Nebulizer every 6 hours PRN Shortness of Breath and/or Wheezing      Physical Exam    Neuro :  no focal deficits  Respiratory: CTA B/L  CV: RRR, S1S2, no murmurs,   Abdominal: Soft, NT, ND +BS,  Extremities: No edema, + peripheral pulses    ASSESSMENT     left breast cellulitis,   r/o recurrence of breast ca with mets,   left pleural eff likely malignant,   uncontrolled bp poss 2nd to pain,   h/o lung mets,   h/o nvasive ductal ca insitu( ER, PA negative, Her2 positive),   s/p surgery in 2015, followed by chemotherapy, on maintainance hormonal therapy,   with questionable mets to C-spine and s/p radiation,   HTN      PLAN      pain control,   gi and dvt profilaxis,   supplement potassium for hypokalemia  thoracic surg cons noted   planned excision by Gen surgery w/ possible Plastic sx involvement     gen surg f/u  ir for pig tail placement for drainage of left pleural eff as per d/w dr walker  p[place]onard f/u   id cons noted   dc maxipime  start clindamycin 600mgs iv q8hrs  had a reaction to vanco earlier but looks like red man syndrome.    heme-onc cons noted   no plan to continue with systemic palliative chemotherapy during the inpt course  continue current meds

## 2019-05-17 NOTE — PROGRESS NOTE ADULT - SUBJECTIVE AND OBJECTIVE BOX
Awake, alert, lying in bed in NAD.     INTERVAL HPI/OVERNIGHT EVENTS:      VITAL SIGNS:  T(F): 98.8 (05-17-19 @ 15:28)  HR: 91 (05-17-19 @ 15:28)  BP: 144/90 (05-17-19 @ 15:28)  RR: 16 (05-17-19 @ 15:28)  SpO2: 97% (05-17-19 @ 15:28)  Wt(kg): --  I&O's Detail          REVIEW OF SYSTEMS:    CONSTITUTIONAL:  No fevers, chills, sweats    HEENT:  Eyes:  No diplopia or blurred vision. ENT:  No earache, sore throat or runny nose.    CARDIOVASCULAR:  No pressure, squeezing, tightness, or heaviness about the chest; no palpitations.    RESPIRATORY:  Per HPI    GASTROINTESTINAL:  No abdominal pain, nausea, vomiting or diarrhea.    GENITOURINARY:  No dysuria, frequency or urgency.    NEUROLOGIC:  No paresthesias, fasciculations, seizures or weakness.    PSYCHIATRIC:  No disorder of thought or mood.      PHYSICAL EXAM:    Constitutional: Well developed and nourished; left breast mass.   Eyes:Perrla  ENMT: normal  Neck:supple  Respiratory: decreased breath sounds - left   Cardiovascular: S1 S2 regular  Gastrointestinal: Soft, Non tender  Extremities: No edema  Vascular:normal  Neurological:Awake, alert,Ox3  Musculoskeletal:Normal      MEDICATIONS  (STANDING):  amLODIPine   Tablet 5 milliGRAM(s) Oral daily  clindamycin IVPB      clindamycin IVPB 600 milliGRAM(s) IV Intermittent every 8 hours  enoxaparin Injectable 40 milliGRAM(s) SubCutaneous daily    MEDICATIONS  (PRN):  acetaminophen   Tablet .. 650 milliGRAM(s) Oral every 6 hours PRN Temp greater or equal to 38C (100.4F), Mild Pain (1 - 3)  ALBUTerol/ipratropium for Nebulization. 3 milliLiter(s) Nebulizer every 6 hours PRN Shortness of Breath and/or Wheezing      Allergies    penicillin (Pruritus; Rash)    Intolerances        LABS:                        15.2   6.62  )-----------( 243      ( 17 May 2019 08:13 )             44.6     05-17    138  |  108  |  5<L>  ----------------------------<  89  3.4<L>   |  22  |  0.58    Ca    7.9<L>      17 May 2019 08:13  Phos  2.6     05-16  Mg     2.1     05-16    TPro  6.4  /  Alb  2.9<L>  /  TBili  0.8  /  DBili  x   /  AST  124<H>  /  ALT  91<H>  /  AlkPhos  98  05-16    PT/INR - ( 17 May 2019 12:46 )   PT: 12.7 sec;   INR: 1.14 ratio         PTT - ( 17 May 2019 12:46 )  PTT:36.7 sec          CAPILLARY BLOOD GLUCOSE            RADIOLOGY & ADDITIONAL TESTS:    CXR:    Ct scan chest:    ekg;    echo:

## 2019-05-17 NOTE — PROGRESS NOTE ADULT - ASSESSMENT
43 female with PMH of invasive ductal ca  (ER, [place] negative, Her2 positive), s/p surgery in 2015, on anti H2N chemotherapy, with  mets to C-spine and s/p radiation, HTN, comes in with complaint of Left breast pain and SOB due to probable malignant pleural effusion    Problem #1 ONC - patient with known stage IV H2N positive breast ca on palliative anti-neoplastic tx  -appreciate CT surgery and pulmonary inputs  -pt may benefit from therapeutic thoracentesis with possible pleurodesis  -optimize pain control and obtain inpt supportive care service input for GOC discussion and symptoms managment  -no plan to continue with systemic palliative chemotherapy during the inpt coures  Will continue to follow;   Dr. Guo covering for the weekend. Please call with questions; d/w CT surgery and NP

## 2019-05-17 NOTE — PROGRESS NOTE ADULT - GASTROINTESTINAL DETAILS
no distention/soft/nontender no guarding/nontender/soft/bowel sounds normal/no rigidity/no distention

## 2019-05-17 NOTE — PROGRESS NOTE ADULT - BREASTS COMMENTS
Fungating mass on L side, with surrounding redness mass in L breast which is tender, no drainage from breast

## 2019-05-18 LAB
ALBUMIN FLD-MCNC: 2.4 G/DL — SIGNIFICANT CHANGE UP
GLUCOSE FLD-MCNC: 79 MG/DL — SIGNIFICANT CHANGE UP
GRAM STN FLD: SIGNIFICANT CHANGE UP
LDH SERPL L TO P-CCNC: 392 U/L — SIGNIFICANT CHANGE UP
NIGHT BLUE STAIN TISS: SIGNIFICANT CHANGE UP
PROT FLD-MCNC: 3.8 G/DL — SIGNIFICANT CHANGE UP
SPECIMEN SOURCE: SIGNIFICANT CHANGE UP
SPECIMEN SOURCE: SIGNIFICANT CHANGE UP

## 2019-05-18 PROCEDURE — 71045 X-RAY EXAM CHEST 1 VIEW: CPT | Mod: 26

## 2019-05-18 RX ADMIN — AMLODIPINE BESYLATE 5 MILLIGRAM(S): 2.5 TABLET ORAL at 06:11

## 2019-05-18 RX ADMIN — Medication 100 MILLIGRAM(S): at 21:36

## 2019-05-18 RX ADMIN — Medication 100 MILLIGRAM(S): at 06:11

## 2019-05-18 RX ADMIN — Medication 100 MILLIGRAM(S): at 13:35

## 2019-05-18 RX ADMIN — ENOXAPARIN SODIUM 40 MILLIGRAM(S): 100 INJECTION SUBCUTANEOUS at 12:42

## 2019-05-18 NOTE — PROGRESS NOTE ADULT - SUBJECTIVE AND OBJECTIVE BOX
Patient is a 43y old  Female who presents with a chief complaint of Left breast pain and swelling (17 May 2019 18:19)      INTERVAL HPI/OVERNIGHT EVENTS:  Pt examined at bedside, nad     VITAL SIGNS:  T(F): 99 (05-17-19 @ 20:25)  HR: 101 (05-17-19 @ 20:25)  BP: 138/85 (05-17-19 @ 20:25)  RR: 16 (05-17-19 @ 20:25)  SpO2: 94% (05-17-19 @ 20:25)  Wt(kg): --  I&O's Detail          REVIEW OF SYSTEMS:    CONSTITUTIONAL:  No fevers, chills, sweats    HEENT:  Eyes:  No diplopia or blurred vision. ENT:  No earache, sore throat or runny nose.    CARDIOVASCULAR:  No pressure, squeezing, tightness, or heaviness about the chest; no palpitations.    RESPIRATORY:  Per HPI    GASTROINTESTINAL:  No abdominal pain, nausea, vomiting or diarrhea.    GENITOURINARY:  No dysuria, frequency or urgency.    NEUROLOGIC:  No paresthesias, fasciculations, seizures or weakness.    PSYCHIATRIC:  No disorder of thought or mood.      PHYSICAL EXAM:    Constitutional: Well developed and nourished  Eyes:Perrla  ENMT: normal  Neck:supple  Respiratory: L sided decr breath sounds  Cardiovascular: S1 S2 regular  Gastrointestinal: Soft, Non tender  Extremities: No edema  Vascular:normal  Neurological:Awake, alert,Ox3  Musculoskeletal:Normal      MEDICATIONS  (STANDING):  amLODIPine   Tablet 5 milliGRAM(s) Oral daily  clindamycin IVPB      clindamycin IVPB 600 milliGRAM(s) IV Intermittent every 8 hours  enoxaparin Injectable 40 milliGRAM(s) SubCutaneous daily    MEDICATIONS  (PRN):  acetaminophen   Tablet .. 650 milliGRAM(s) Oral every 6 hours PRN Temp greater or equal to 38C (100.4F), Mild Pain (1 - 3)  ALBUTerol/ipratropium for Nebulization. 3 milliLiter(s) Nebulizer every 6 hours PRN Shortness of Breath and/or Wheezing      Allergies    penicillin (Pruritus; Rash)    Intolerances        LABS:                        15.2   6.62  )-----------( 243      ( 17 May 2019 08:13 )             44.6     05-17    138  |  108  |  5<L>  ----------------------------<  89  3.4<L>   |  22  |  0.58    Ca    7.9<L>      17 May 2019 08:13  Phos  2.6     05-16  Mg     2.1     05-16    TPro  6.4  /  Alb  2.9<L>  /  TBili  0.8  /  DBili  x   /  AST  124<H>  /  ALT  91<H>  /  AlkPhos  98  05-16    PT/INR - ( 17 May 2019 12:46 )   PT: 12.7 sec;   INR: 1.14 ratio         PTT - ( 17 May 2019 12:46 )  PTT:36.7 sec          CAPILLARY BLOOD GLUCOSE            RADIOLOGY & ADDITIONAL TESTS:    CXR:    Ct scan chest:    ekg;    echo:

## 2019-05-18 NOTE — PROGRESS NOTE ADULT - ASSESSMENT
44 yo female w/ left sided pigtail placed by IR for pleural effusion    1- keep left pigtail to waterseal  2- change pleurovac as needed once it becomes full  3- daily chest xrays  4- please monitor and record pigtail output daily

## 2019-05-18 NOTE — PROGRESS NOTE ADULT - SUBJECTIVE AND OBJECTIVE BOX
Patient is a 43y old  Female who presents with a chief complaint of Left breast pain and swelling (18 May 2019 05:19)      pt seen in icu [  ], reg med floor [ x  ], bed [x  ], chair at bedside [   ], a+o x3 [ x ], lethargic [  ],  nad [ x ]      left chest tube to pleurovac off suction [x]      Allergies    penicillin (Pruritus; Rash)        Vitals    T(F): 98.7 (05-18-19 @ 09:00), Max: 99.7 (05-17-19 @ 14:08)  HR: 99 (05-18-19 @ 09:00) (90 - 103)  BP: 115/87 (05-18-19 @ 09:00) (115/87 - 144/90)  RR: 17 (05-18-19 @ 09:00) (16 - 17)  SpO2: 96% (05-18-19 @ 09:00) (94% - 98%)  Wt(kg): --  CAPILLARY BLOOD GLUCOSE          Labs                          15.2   6.62  )-----------( 243      ( 17 May 2019 08:13 )             44.6       05-17    138  |  108  |  5<L>  ----------------------------<  89  3.4<L>   |  22  |  0.58    Ca    7.9<L>      17 May 2019 08:13  Phos  2.6     05-16  Mg     2.1     05-16    TPro  6.4  /  Alb  2.9<L>  /  TBili  0.8  /  DBili  x   /  AST  124<H>  /  ALT  91<H>  /  AlkPhos  98  05-16            .Body Fluid  05-18 @ 03:24 --  --    polymorphonuclear leukocytes seen  No organisms seen  by cytocentrifuge      .Blood  05-16 @ 15:10   No growth to date.  --  --          Radiology Results    < from: Xray Chest 1 View-PORTABLE IMMEDIATE (05.17.19 @ 16:33) >  IMPRESSION:    Interval chest tube insertion.    Improved mediastinal shift.    Hyperlucency in the left apex either due to hydropneumothorax from   instrumentation or hyperinflated remaining lung. Recommend follow-up.    < end of copied text >      Meds    MEDICATIONS  (STANDING):  amLODIPine   Tablet 5 milliGRAM(s) Oral daily  clindamycin IVPB      clindamycin IVPB 600 milliGRAM(s) IV Intermittent every 8 hours  enoxaparin Injectable 40 milliGRAM(s) SubCutaneous daily      MEDICATIONS  (PRN):  acetaminophen   Tablet .. 650 milliGRAM(s) Oral every 6 hours PRN Temp greater or equal to 38C (100.4F), Mild Pain (1 - 3)  ALBUTerol/ipratropium for Nebulization. 3 milliLiter(s) Nebulizer every 6 hours PRN Shortness of Breath and/or Wheezing      Physical Exam      Neuro :  no focal deficits  Respiratory: CTA B/L  CV: RRR, S1S2, no murmurs,   Abdominal: Soft, NT, ND +BS,  Extremities: No edema, + peripheral pulses    ASSESSMENT     left breast cellulitis,   r/o recurrence of breast ca with mets,   left pleural eff likely malignant,   hydropneumothorax  uncontrolled bp poss 2nd to pain,   h/o lung mets,   h/o nvasive ductal ca insitu( ER, [place] negative, Her2 positive),   s/p surgery in 2015, followed by chemotherapy, on maintainance hormonal therapy,   with questionable mets to C-spine and s/p radiation,   HTN      PLAN      pain control,   gi and dvt profilaxis,   supplement potassium for hypokalemia  s/p left pigtail by ir  thoracic surg f/u noted  keep left pigtail to waterseal  change pleurovac as needed once it becomes full  daily chest xrays  please monitor and record pigtail output daily  cxr with Improved mediastinal shift. Hyperlucency in the left apex either due to hydropneumothorax from instrumentation or hyperinflated remaining lung noted above.  pulm f/u noted  planned excision by Gen surgery w/ possible Plastic sx involvement     gen surg f/u  id f/u   cont clindamycin 600mgs iv q8hrs  heme-onc f/u  no plan to continue with systemic palliative chemotherapy during the inpt course  continue current meds

## 2019-05-18 NOTE — PROGRESS NOTE ADULT - SUBJECTIVE AND OBJECTIVE BOX
43y Female with no overnight complaints. Tolerating reg diet.  IR placed left sided pigtail catheter yesterday    Vital Signs:  T(C): 37.1 (05-18-19 @ 09:00), Max: 37.6 (05-17-19 @ 14:08)  HR: 99 (05-18-19 @ 09:00) (90 - 103)  BP: 115/87 (05-18-19 @ 09:00) (115/87 - 144/90)  RR: 17 (05-18-19 @ 09:00) (16 - 17)  SpO2: 96% (05-18-19 @ 09:00) (94% - 98%)  Wt(kg): --    Physical Exam:  General: NAD  Left Chest: pigtail in place with dry dressing. no subcu emphysema. pigtail is to waterseal and has a ss output. no air leak noted

## 2019-05-19 RX ADMIN — Medication 100 MILLIGRAM(S): at 14:17

## 2019-05-19 RX ADMIN — ENOXAPARIN SODIUM 40 MILLIGRAM(S): 100 INJECTION SUBCUTANEOUS at 11:39

## 2019-05-19 RX ADMIN — Medication 100 MILLIGRAM(S): at 05:37

## 2019-05-19 RX ADMIN — Medication 100 MILLIGRAM(S): at 21:59

## 2019-05-19 RX ADMIN — AMLODIPINE BESYLATE 5 MILLIGRAM(S): 2.5 TABLET ORAL at 05:37

## 2019-05-19 NOTE — PROGRESS NOTE ADULT - SUBJECTIVE AND OBJECTIVE BOX
Feels OK  With chest tube drainage in place  L Breast with erythematous raised lesion around 4 cm in diameter

## 2019-05-19 NOTE — PROGRESS NOTE ADULT - SUBJECTIVE AND OBJECTIVE BOX
43y Female with no overnight complaints. Tolerating reg diet.  no SOB/dyspnea/CP    Vital Signs:  T(C): 36.4 (05-19-19 @ 04:42), Max: 36.8 (05-18-19 @ 13:42)  HR: 87 (05-19-19 @ 04:42) (87 - 110)  BP: 128/86 (05-19-19 @ 04:42) (127/89 - 130/94)  RR: 16 (05-19-19 @ 04:42) (16 - 18)  SpO2: 95% (05-19-19 @ 04:42) (94% - 96%)  Wt(kg): --    Physical Exam:  General: NAD, comfortable  L chest: no subcu emphysema, dressing c/d/i, no air leak, tube to waterseal, output ss    Ins/Outs:    05-18 @ 07:01  -  05-19 @ 07:00  --------------------------------------------------------  IN:  Total IN: 0 mL    OUT:    Chest Tube: 1400 mL  Total OUT: 1400 mL    Total NET: -1400 mL    < from: Xray Chest 1 View- PORTABLE-Routine (05.18.19 @ 13:21) >  IMPRESSION:   Near complete evacuation of left pleural effusion with a small apical   pneumothorax.    Residual small patchy left perihilar/infrahilar opacity.    Left pigtail catheter unchanged in position.    < end of copied text >

## 2019-05-19 NOTE — PROGRESS NOTE ADULT - SUBJECTIVE AND OBJECTIVE BOX
Pt is awake, alert, sitting oob to chair in NAD. Left chest pigtail - draining. Per RN drained ~ 3L total from initial insertion. Pt states she feels better. Breathing improved.      INTERVAL HPI/OVERNIGHT EVENTS:      VITAL SIGNS:  T(F): 97.5 (05-19-19 @ 04:42)  HR: 87 (05-19-19 @ 04:42)  BP: 128/86 (05-19-19 @ 04:42)  RR: 16 (05-19-19 @ 04:42)  SpO2: 95% (05-19-19 @ 04:42)  Wt(kg): --  I&O's Detail    18 May 2019 07:01  -  19 May 2019 07:00  --------------------------------------------------------  IN:  Total IN: 0 mL    OUT:    Chest Tube: 1400 mL  Total OUT: 1400 mL    Total NET: -1400 mL              REVIEW OF SYSTEMS:    CONSTITUTIONAL:  No fevers, chills, sweats    HEENT:  Eyes:  No diplopia or blurred vision. ENT:  No earache, sore throat or runny nose.    CARDIOVASCULAR:  No pressure, squeezing, tightness, or heaviness about the chest; no palpitations.    RESPIRATORY:  Per HPI    GASTROINTESTINAL:  No abdominal pain, nausea, vomiting or diarrhea.    GENITOURINARY:  No dysuria, frequency or urgency.    NEUROLOGIC:  No paresthesias, fasciculations, seizures or weakness.    PSYCHIATRIC:  No disorder of thought or mood.      PHYSICAL EXAM:    Constitutional: Well developed and nourished  Eyes:Perrla  ENMT: normal  Neck:supple  Respiratory: good air entry  Cardiovascular: S1 S2 regular  Gastrointestinal: Soft, Non tender  Extremities: No edema  Vascular:normal  Neurological:Awake, alert,Ox3  Musculoskeletal:Normal      MEDICATIONS  (STANDING):  amLODIPine   Tablet 5 milliGRAM(s) Oral daily  clindamycin IVPB      clindamycin IVPB 600 milliGRAM(s) IV Intermittent every 8 hours  enoxaparin Injectable 40 milliGRAM(s) SubCutaneous daily    MEDICATIONS  (PRN):  acetaminophen   Tablet .. 650 milliGRAM(s) Oral every 6 hours PRN Temp greater or equal to 38C (100.4F), Mild Pain (1 - 3)  ALBUTerol/ipratropium for Nebulization. 3 milliLiter(s) Nebulizer every 6 hours PRN Shortness of Breath and/or Wheezing      Allergies    penicillin (Pruritus; Rash)    Intolerances        LABS:                    CAPILLARY BLOOD GLUCOSE            RADIOLOGY & ADDITIONAL TESTS:    CXR:  < from: Xray Chest 1 View- PORTABLE-Routine (05.18.19 @ 13:21) >  IMPRESSION:   Near complete evacuation of left pleural effusion with a small apical   pneumothorax.    Residual small patchy left perihilar/infrahilar opacity.    Left pigtail catheter unchanged in position.    < end of copied text >    Ct scan chest:    ekg;    echo:

## 2019-05-19 NOTE — DIETITIAN INITIAL EVALUATION ADULT. - OTHER INFO
Nutrition consult requested for decreased intake >5d; lives home PTA: skin wound noted; denied GI distress, chewing or swallowing problem at present, food choices obtained, unable to confirm wt changes PTA; intake 4o to 80% per flow sheets at present

## 2019-05-19 NOTE — PROGRESS NOTE ADULT - SUBJECTIVE AND OBJECTIVE BOX
Patient is a 43y old  Female who presents with a chief complaint of Left breast pain and swelling (18 May 2019 10:07)    pt seen in icu [  ], reg med floor [ x  ], bed [x  ], chair at bedside [   ], a+o x3 [ x ], lethargic [  ],  nad [ x ]      left chest tube to pleurovac off suction [x]      Allergies    penicillin (Pruritus; Rash)        Vitals    T(F): 97.5 (05-19-19 @ 04:42), Max: 98.7 (05-18-19 @ 09:00)  HR: 87 (05-19-19 @ 04:42) (87 - 110)  BP: 128/86 (05-19-19 @ 04:42) (115/87 - 130/94)  RR: 16 (05-19-19 @ 04:42) (16 - 18)  SpO2: 95% (05-19-19 @ 04:42) (94% - 96%)  Wt(kg): --  CAPILLARY BLOOD GLUCOSE          Labs      Pleural Fl  05-18 @ 03:26 --  --  --      .Body Fluid  05-18 @ 03:24 --  --    polymorphonuclear leukocytes seen  No organisms seen  by cytocentrifuge      .Blood  05-16 @ 15:10   No growth to date.  --  --          Radiology Results    < from: Xray Chest 1 View- PORTABLE-Routine (05.18.19 @ 13:21) >    IMPRESSION:   Near complete evacuation of left pleural effusion with a small apical   pneumothorax.    Residual small patchy left perihilar/infrahilar opacity.    Left pigtail catheter unchanged in position.    < end of copied text >        Meds    MEDICATIONS  (STANDING):  amLODIPine   Tablet 5 milliGRAM(s) Oral daily  clindamycin IVPB      clindamycin IVPB 600 milliGRAM(s) IV Intermittent every 8 hours  enoxaparin Injectable 40 milliGRAM(s) SubCutaneous daily      MEDICATIONS  (PRN):  acetaminophen   Tablet .. 650 milliGRAM(s) Oral every 6 hours PRN Temp greater or equal to 38C (100.4F), Mild Pain (1 - 3)  ALBUTerol/ipratropium for Nebulization. 3 milliLiter(s) Nebulizer every 6 hours PRN Shortness of Breath and/or Wheezing      Physical Exam        Neuro :  no focal deficits  Respiratory: improving left mid to lower lung breathsounds  CV: RRR, S1S2, no murmurs,   Abdominal: Soft, NT, ND +BS,  Extremities: No edema, + peripheral pulses    ASSESSMENT     left breast cellulitis,   r/o recurrence of breast ca with mets,   left pleural eff likely malignant,   hydropneumothorax  uncontrolled bp poss 2nd to pain,   h/o lung mets,   h/o nvasive ductal ca insitu( ER, [place] negative, Her2 positive),   s/p surgery in 2015, followed by chemotherapy, on maintainance hormonal therapy,   with questionable mets to C-spine and s/p radiation,   HTN      PLAN      pain control,   gi and dvt profilaxis,   supplement potassium for hypokalemia  s/p left pigtail by ir  thoracic surg f/u noted  keep left pigtail to waterseal  change pleurovac as needed once it becomes full  chest xray with Near complete evacuation of left pleural effusion with a small apical pneumothorax noted above.  pigtail output decresasing  pulm f/u  planned excision by Gen surgery w/ possible Plastic sx involvement     gen surg f/u  id f/u   cont clindamycin 600mgs iv q8hrs  cx neg noted above  f/u fluid cytology  heme-onc f/u  no plan to continue with systemic palliative chemotherapy during the inpt course  cardio eval for medical clearance  continue current meds

## 2019-05-19 NOTE — PROGRESS NOTE ADULT - ASSESSMENT
Recurrent breast cancer , pleural effusion, breast lesion  For pleurodesis , and possible toilet mastectomy

## 2019-05-19 NOTE — DIETITIAN INITIAL EVALUATION ADULT. - PROBLEM SELECTOR PLAN 1
Comes in with metastatic breast cancer, left breast fungating mass with lytic lesions on spine   h/o invasive ductal ca insitu( ER, [place] negative, Her2 positive), s/p surgery in 2015, followed by chemotherapy, on maintainance hormonal therapy, with questionable mets to C-spine and s/p radiation

## 2019-05-19 NOTE — PROGRESS NOTE ADULT - ASSESSMENT
1. Pleural Effusion  - Large Left pleural effusion with complete collapse of LLL and near complete collapse of NARCISO.   - S/P Pigtail; in place; draining.   - R/O malignant effusion - F/U with pathology.   - Bronchodilators  - O2 Supplement  - F/U XR noted near complete evacuation.   - Continue to f/u with CXR.     2. Metastatic Breast CA   - Now with left breast fungating mass   - Planning for surgical removal.   - S/P chemo  - On hormonal therapy  - Spine Mets  - S/P radiation  - Heme/onc eval  - Surgical eval noted.   - ID eval noted.    - Abx for cellulitis of left breast   - Pain control.   - DVT and GI PPX

## 2019-05-19 NOTE — DIETITIAN INITIAL EVALUATION ADULT. - PERTINENT MEDS FT
reviewed   MEDICATIONS  (STANDING):  amLODIPine   Tablet 5 milliGRAM(s) Oral daily  clindamycin IVPB      clindamycin IVPB 600 milliGRAM(s) IV Intermittent every 8 hours  enoxaparin Injectable 40 milliGRAM(s) SubCutaneous daily    MEDICATIONS  (PRN):  acetaminophen   Tablet .. 650 milliGRAM(s) Oral every 6 hours PRN Temp greater or equal to 38C (100.4F), Mild Pain (1 - 3)  ALBUTerol/ipratropium for Nebulization. 3 milliLiter(s) Nebulizer every 6 hours PRN Shortness of Breath and/or Wheezing

## 2019-05-20 LAB
COMMENT - FLUIDS: SIGNIFICANT CHANGE UP

## 2019-05-20 PROCEDURE — 99222 1ST HOSP IP/OBS MODERATE 55: CPT

## 2019-05-20 PROCEDURE — 99232 SBSQ HOSP IP/OBS MODERATE 35: CPT

## 2019-05-20 PROCEDURE — 70553 MRI BRAIN STEM W/O & W/DYE: CPT | Mod: 26

## 2019-05-20 PROCEDURE — 71046 X-RAY EXAM CHEST 2 VIEWS: CPT | Mod: 26

## 2019-05-20 RX ADMIN — ENOXAPARIN SODIUM 40 MILLIGRAM(S): 100 INJECTION SUBCUTANEOUS at 11:02

## 2019-05-20 RX ADMIN — Medication 650 MILLIGRAM(S): at 17:20

## 2019-05-20 RX ADMIN — Medication 100 MILLIGRAM(S): at 16:28

## 2019-05-20 RX ADMIN — Medication 100 MILLIGRAM(S): at 21:20

## 2019-05-20 RX ADMIN — AMLODIPINE BESYLATE 5 MILLIGRAM(S): 2.5 TABLET ORAL at 05:43

## 2019-05-20 RX ADMIN — Medication 650 MILLIGRAM(S): at 16:29

## 2019-05-20 RX ADMIN — Medication 100 MILLIGRAM(S): at 05:43

## 2019-05-20 NOTE — PROGRESS NOTE ADULT - SUBJECTIVE AND OBJECTIVE BOX
INTERVAL HPI/OVERNIGHT EVENTS:  Pt resting comfortably. No acute complaints.   Denies respiratory issues.   L chest pigtail cath to [facility].    MEDICATIONS  (STANDING):  amLODIPine   Tablet 5 milliGRAM(s) Oral daily  clindamycin IVPB      clindamycin IVPB 600 milliGRAM(s) IV Intermittent every 8 hours  enoxaparin Injectable 40 milliGRAM(s) SubCutaneous daily    MEDICATIONS  (PRN):  acetaminophen   Tablet .. 650 milliGRAM(s) Oral every 6 hours PRN Temp greater or equal to 38C (100.4F), Mild Pain (1 - 3)  ALBUTerol/ipratropium for Nebulization. 3 milliLiter(s) Nebulizer every 6 hours PRN Shortness of Breath and/or Wheezing    Vital Signs Last 24 Hrs  T(C): 37.2 (20 May 2019 04:56), Max: 37.2 (20 May 2019 04:56)  T(F): 98.9 (20 May 2019 04:56), Max: 98.9 (20 May 2019 04:56)  HR: 94 (20 May 2019 04:56) (90 - 103)  BP: 131/86 (20 May 2019 04:56) (131/86 - 133/89)  BP(mean): --  RR: 16 (20 May 2019 04:56) (16 - 16)  SpO2: 97% (20 May 2019 04:56) (97% - 98%)    Physical:  General: A&Ox3. NAD.  Chest: L pigtail in place. Dressing C/D/I.    I&O's Detail    19 May 2019 07:01  -  20 May 2019 07:00  --------------------------------------------------------  IN:  Total IN: 0 mL    OUT:    Chest Tube: 280 mL serosanguinous  Total OUT: 280 mL    Total NET: -280 mL    < from: Xray Chest 2 Views PA/Lat (05.20.19 @ 08:36) >  Impression: Stable left pigtail chest tube.    Small left apical pneumothorax, slightly increased since the previous   examination.    No evidence for pleural effusion.    Left perihilar density has improved. New right infrahilar pulmonary   infiltrate.    Stable cardiac silhouette.    < end of copied text >

## 2019-05-20 NOTE — CONSULT NOTE ADULT - ASSESSMENT
A/P Clinical examination consistent with right occipital and left parietal lesion; discussed getting the recent MRI brain but she woul like an updated imaging to decide about treatment so request MRI brain w/wo

## 2019-05-20 NOTE — PROGRESS NOTE ADULT - SUBJECTIVE AND OBJECTIVE BOX
Patient is a 43y old  Female who presents with a chief complaint of Left breast pain and swelling (20 May 2019 09:14)    Pt is awake, alert, lying in  bed  in NAD. Left chest pigtail - draining.  Pt states she feels better. Breathing improved. Clinically stable.   INTERVAL HPI/OVERNIGHT EVENTS:      VITAL SIGNS:  T(F): 98.9 (05-20-19 @ 04:56)  HR: 94 (05-20-19 @ 04:56)  BP: 131/86 (05-20-19 @ 04:56)  RR: 16 (05-20-19 @ 04:56)  SpO2: 97% (05-20-19 @ 04:56)  Wt(kg): --  I&O's Detail    19 May 2019 07:01  -  20 May 2019 07:00  --------------------------------------------------------  IN:  Total IN: 0 mL    OUT:    Chest Tube: 280 mL  Total OUT: 280 mL    Total NET: -280 mL              REVIEW OF SYSTEMS:    CONSTITUTIONAL:  No fevers, chills, sweats    HEENT:  Eyes:  No diplopia or blurred vision. ENT:  No earache, sore throat or runny nose.    CARDIOVASCULAR:  No pressure, squeezing, tightness, or heaviness about the chest; no palpitations.    RESPIRATORY:  Per HPI    GASTROINTESTINAL:  No abdominal pain, nausea, vomiting or diarrhea.    GENITOURINARY:  No dysuria, frequency or urgency.    NEUROLOGIC:  No paresthesias, fasciculations, seizures or weakness.    PSYCHIATRIC:  No disorder of thought or mood.      PHYSICAL EXAM:    Constitutional: Well developed and nourished  Eyes:Perrla  ENMT: normal  Neck:supple  Respiratory: good air entry  Cardiovascular: S1 S2 regular  Gastrointestinal: Soft, Non tender  Extremities: No edema  Vascular:normal  Neurological:Awake, alert,Ox3  Musculoskeletal:Normal      MEDICATIONS  (STANDING):  amLODIPine   Tablet 5 milliGRAM(s) Oral daily  clindamycin IVPB      clindamycin IVPB 600 milliGRAM(s) IV Intermittent every 8 hours  enoxaparin Injectable 40 milliGRAM(s) SubCutaneous daily    MEDICATIONS  (PRN):  acetaminophen   Tablet .. 650 milliGRAM(s) Oral every 6 hours PRN Temp greater or equal to 38C (100.4F), Mild Pain (1 - 3)  ALBUTerol/ipratropium for Nebulization. 3 milliLiter(s) Nebulizer every 6 hours PRN Shortness of Breath and/or Wheezing      Allergies    penicillin (Pruritus; Rash)    Intolerances        LABS:                    CAPILLARY BLOOD GLUCOSE            RADIOLOGY & ADDITIONAL TESTS:    CXR:  < from: Xray Chest 2 Views PA/Lat (05.20.19 @ 08:36) >  Impression: Stable left pigtail chest tube.    Small left apical pneumothorax, slightly increased since the previous   examination.    No evidence for pleural effusion.    Left perihilar density has improved. New right infrahilar pulmonary   infiltrate.    Stable cardiac silhouette.    < end of copied text >    Ct scan chest:    ekg;    echo: Pt is awake, alert, lying in  bed  in NAD. Left chest pigtail - draining.  Pt states she feels better. Breathing improved.     INTERVAL HPI/OVERNIGHT EVENTS:      VITAL SIGNS:  T(F): 98.9 (05-20-19 @ 04:56)  HR: 94 (05-20-19 @ 04:56)  BP: 131/86 (05-20-19 @ 04:56)  RR: 16 (05-20-19 @ 04:56)  SpO2: 97% (05-20-19 @ 04:56)  Wt(kg): --  I&O's Detail    19 May 2019 07:01  -  20 May 2019 07:00  --------------------------------------------------------  IN:  Total IN: 0 mL    OUT:    Chest Tube: 280 mL  Total OUT: 280 mL    Total NET: -280 mL              REVIEW OF SYSTEMS:    CONSTITUTIONAL:  No fevers, chills, sweats    HEENT:  Eyes:  No diplopia or blurred vision. ENT:  No earache, sore throat or runny nose.    CARDIOVASCULAR:  No pressure, squeezing, tightness, or heaviness about the chest; no palpitations.    RESPIRATORY:  Per HPI    GASTROINTESTINAL:  No abdominal pain, nausea, vomiting or diarrhea.    GENITOURINARY:  No dysuria, frequency or urgency.    NEUROLOGIC:  No paresthesias, fasciculations, seizures or weakness.    PSYCHIATRIC:  No disorder of thought or mood.      PHYSICAL EXAM:    Constitutional: Well developed and nourished; left breast firm/hard - with mass.   Eyes:Perrla  ENMT: normal  Neck:supple  Respiratory: good air entry; left pigtail   Cardiovascular: S1 S2 regular  Gastrointestinal: Soft, Non tender  Extremities: No edema  Vascular:normal  Neurological: Awake, alert,Ox3  Musculoskeletal: Normal      MEDICATIONS  (STANDING):  amLODIPine   Tablet 5 milliGRAM(s) Oral daily  clindamycin IVPB      clindamycin IVPB 600 milliGRAM(s) IV Intermittent every 8 hours  enoxaparin Injectable 40 milliGRAM(s) SubCutaneous daily    MEDICATIONS  (PRN):  acetaminophen   Tablet .. 650 milliGRAM(s) Oral every 6 hours PRN Temp greater or equal to 38C (100.4F), Mild Pain (1 - 3)  ALBUTerol/ipratropium for Nebulization. 3 milliLiter(s) Nebulizer every 6 hours PRN Shortness of Breath and/or Wheezing      Allergies    penicillin (Pruritus; Rash)    Intolerances        LABS:                    CAPILLARY BLOOD GLUCOSE            RADIOLOGY & ADDITIONAL TESTS:    CXR:  < from: Xray Chest 2 Views PA/Lat (05.20.19 @ 08:36) >  Impression: Stable left pigtail chest tube.    Small left apical pneumothorax, slightly increased since the previous   examination.    No evidence for pleural effusion.    Left perihilar density has improved. New right infrahilar pulmonary   infiltrate.    Stable cardiac silhouette.    < end of copied text >    Ct scan chest:    ekg;    echo:

## 2019-05-20 NOTE — PROGRESS NOTE ADULT - SUBJECTIVE AND OBJECTIVE BOX
Patient is a 43y old  Female who presents with a chief complaint of Left breast pain and swelling (20 May 2019 10:47)      pt seen in icu [  ], reg med floor [ x  ], bed [x  ], chair at bedside [   ], a+o x3 [ x ], lethargic [  ],  nad [ x ]      left chest tube to pleurovac off suction [x]      Allergies    penicillin (Pruritus; Rash)        Vitals    T(F): 98.9 (05-20-19 @ 04:56), Max: 98.9 (05-20-19 @ 04:56)  HR: 94 (05-20-19 @ 04:56) (90 - 103)  BP: 131/86 (05-20-19 @ 04:56) (131/86 - 133/89)  RR: 16 (05-20-19 @ 04:56) (16 - 16)  SpO2: 97% (05-20-19 @ 04:56) (97% - 98%)  Wt(kg): --  CAPILLARY BLOOD GLUCOSE          Labs        Pleural Fl  05-18 @ 03:26 --  --  --      .Body Fluid  05-18 @ 03:24   No growth  --    polymorphonuclear leukocytes seen  No organisms seen  by cytocentrifuge      .Blood  05-16 @ 15:10   No growth to date.  --  --          Radiology Results      Meds    MEDICATIONS  (STANDING):  amLODIPine   Tablet 5 milliGRAM(s) Oral daily  clindamycin IVPB      clindamycin IVPB 600 milliGRAM(s) IV Intermittent every 8 hours  enoxaparin Injectable 40 milliGRAM(s) SubCutaneous daily      MEDICATIONS  (PRN):  acetaminophen   Tablet .. 650 milliGRAM(s) Oral every 6 hours PRN Temp greater or equal to 38C (100.4F), Mild Pain (1 - 3)  ALBUTerol/ipratropium for Nebulization. 3 milliLiter(s) Nebulizer every 6 hours PRN Shortness of Breath and/or Wheezing      Physical Exam      Neuro :  no focal deficits  Respiratory: improving left mid to lower lung breath sounds  CV: RRR, S1S2, no murmurs,   Abdominal: Soft, NT, ND +BS,  Extremities: No edema, + peripheral pulses        ASSESSMENT       left breast cellulitis,   r/o recurrence of breast ca with mets,   left pleural eff likely malignant,   hydropneumothorax  uncontrolled bp poss 2nd to pain,   h/o lung mets,   h/o nvasive ductal ca insitu( ER, MI negative, Her2 positive),   s/p surgery in 2015, followed by chemotherapy, on maintainance hormonal therapy,   with questionable mets to C-spine and s/p radiation,   HTN      PLAN      pain control,   gi and dvt profilaxis,   supplement potassium for hypokalemia  s/p left pigtail by ir  thoracic surg f/u noted  keep left pigtail to water seal  change pleurovac as needed once it becomes full  pigtail output decreasing   chest xray with Near complete evacuation of left pleural effusion with a small apical pneumothorax noted above.  pulm f/u  possible mastectomy by thoracic surgery w/ possible Plastic sx involvement     gen surg f/u  id f/u   cont clindamycin 600mgs iv q8hrs  cx neg noted above  f/u fluid cytology  heme-onc f/u  no plan to continue with systemic palliative chemotherapy during the inpt course  cardio eval for medical clearance  continue current meds Patient is a 43y old  Female who presents with a chief complaint of Left breast pain and swelling (20 May 2019 10:47)      pt seen in icu [  ], reg med floor [ x  ], bed [x  ], chair at bedside [   ], a+o x3 [ x ], lethargic [  ],  nad [ x ]      left chest tube to pleurovac off suction [x]      Allergies    penicillin (Pruritus; Rash)        Vitals    T(F): 98.9 (05-20-19 @ 04:56), Max: 98.9 (05-20-19 @ 04:56)  HR: 94 (05-20-19 @ 04:56) (90 - 103)  BP: 131/86 (05-20-19 @ 04:56) (131/86 - 133/89)  RR: 16 (05-20-19 @ 04:56) (16 - 16)  SpO2: 97% (05-20-19 @ 04:56) (97% - 98%)  Wt(kg): --  CAPILLARY BLOOD GLUCOSE          Labs        Pleural Fl  05-18 @ 03:26 --  --  --      .Body Fluid  05-18 @ 03:24   No growth  --    polymorphonuclear leukocytes seen  No organisms seen  by cytocentrifuge      .Blood  05-16 @ 15:10   No growth to date.  --  --          Radiology Results      Meds    MEDICATIONS  (STANDING):  amLODIPine   Tablet 5 milliGRAM(s) Oral daily  clindamycin IVPB      clindamycin IVPB 600 milliGRAM(s) IV Intermittent every 8 hours  enoxaparin Injectable 40 milliGRAM(s) SubCutaneous daily      MEDICATIONS  (PRN):  acetaminophen   Tablet .. 650 milliGRAM(s) Oral every 6 hours PRN Temp greater or equal to 38C (100.4F), Mild Pain (1 - 3)  ALBUTerol/ipratropium for Nebulization. 3 milliLiter(s) Nebulizer every 6 hours PRN Shortness of Breath and/or Wheezing      Physical Exam      Neuro :  no focal deficits  Respiratory: improving left mid to lower lung breath sounds  CV: RRR, S1S2, no murmurs,   Abdominal: Soft, NT, ND +BS,  Extremities: No edema, + peripheral pulses        ASSESSMENT       left breast cellulitis,   r/o recurrence of breast ca with mets,   left pleural eff likely malignant,   hydropneumothorax  uncontrolled bp poss 2nd to pain,   h/o lung mets,   h/o nvasive ductal ca insitu( ER, MD negative, Her2 positive),   s/p surgery in 2015, followed by chemotherapy, on maintainance hormonal therapy,   with questionable mets to C-spine and s/p radiation,   HTN      PLAN      pain control,   gi and dvt profilaxis,   supplement potassium for hypokalemia  s/p left pigtail by ir  thoracic surg f/u noted  keep left pigtail to water seal  change pleurovac as needed once it becomes full  pigtail output decreasing   chest xray with Near complete evacuation of left pleural effusion with a small apical pneumothorax noted above.  pulm f/u  possible mastectomy by thoracic surgery w/ possible Plastic sx involvement     gen surg f/u  id f/u   cont clindamycin 600mgs iv q8hrs  cx neg noted above  f/u fluid cytology  heme-onc f/u  no plan to continue with systemic palliative chemotherapy during the inpt course  cardio eval for medical clearance  neuro cons for possible MRI prior to surgery  continue current meds

## 2019-05-20 NOTE — PROGRESS NOTE ADULT - ASSESSMENT
1. Pleural Effusion  - Large Left pleural effusion with complete collapse of LLL and near complete collapse of NARCISO.   - S/P Pigtail; in place; draining.   - R/O malignant effusion - F/U with pathology.   - Bronchodilators  - O2 Supplement  - F/U XR noted near complete evacuation.   - Continue to f/u with CXR.     2. Metastatic Breast CA   - Now with left breast fungating mass   - Planning for surgical removal.   - S/P chemo  - On hormonal therapy  - Spine Mets  - S/P radiation  - Heme/onc eval  - Surgical eval noted.   - ID eval noted.    - Abx for cellulitis of left breast   - Pain control.   - DVT and GI PPX 1. Pleural Effusion  - Large Left pleural effusion with complete collapse of LLL and near complete collapse of NRACISO.   - S/P Pigtail; in place; drainage decreasing.   - R/O malignant effusion - F/U with pathology.   - Bronchodilators  - O2 Supplement  - F/U XR noted near complete evacuation.   - Continue to f/u with CXR.     2. Metastatic Breast CA   - Now with left breast fungating mass   - Planning for surgical removal.   - S/P chemo  - On hormonal therapy  - Spine Mets  - S/P radiation  - Heme/onc eval  - Surgical eval noted.   - ID eval noted.    - Abx for cellulitis of left breast   - Pain control.   - DVT and GI PPX

## 2019-05-20 NOTE — PROGRESS NOTE ADULT - ASSESSMENT
43 female with PMH of invasive ductal ca insitu( ER, MT negative, Her2 positive), s/p surgery in 2015, followed by chemotherapy, on maintainance hormonal therapy, with questionable mets to C-spine and s/p radiation, HTN, comes in with complaint of Left breast pain and mass *2 weeks. Patient had sudden onset pain and then fast growing mass and swelling with involvement of overlying skin. Denies any fever, chills, nausea, vomiting , abdominal pain, nipple discharge.   Admits to have exertional sob. Patient is being followed up on op basis by  who did surgery and Dr. Brantley.     In Ed, BP: 174/107 mm hg, HR: 110, Temp: 100.2 F  cbc wnl   bmp shows k of 3.2   CT shows complete white out of left lung   Marked masslike thickening of the outer lower quadrant of the left breast with underlying confluent breast mass measuring up to 4.8 x 3.6 cm compatible with carcinoma.  Large left pleural effusion with complete collapse of the left lower lobe and near complete collapse of the left upper lobe, presumably malignant effusion.  Several sclerotic lesions within the spine most likely representing bony metastatic disease. Compression deformities of T3, T7, and T10 likely representing pathologic compression deformities.    Will admit to medicine for Left breast mass, likely overlying cellulitis, Malignant pleural effusion.     Pt. seen and examined.  Pt. reports being comfortable except occasional left breast pain which resolves with meds.  Pt. with no c/o CP, SOB or other discomfort.

## 2019-05-20 NOTE — PROGRESS NOTE ADULT - ASSESSMENT
43y.o. Female with metastatic breast cancer with left pleural effusion    -Keep pigtail cath to waterseal  -O2 support prn  -Pain control prn  -Medical optimization for general surgery 43y.o. Female with metastatic breast cancer with left pleural effusion    -Place pigtail cath back to suction  -Serial chest x-rays  -O2 support prn  -Pain control prn  -Medical optimization for general surgery. Catheter likely to remain in place until after breast procedure

## 2019-05-20 NOTE — PROGRESS NOTE ADULT - SUBJECTIVE AND OBJECTIVE BOX
NP Note discussed with  Primary Attending    Patient is a 43y old  Female who presents with a chief complaint of Left breast pain and swelling (20 May 2019 11:25)      INTERVAL HPI/OVERNIGHT EVENTS: no new complaints    MEDICATIONS  (STANDING):  amLODIPine   Tablet 5 milliGRAM(s) Oral daily  clindamycin IVPB      clindamycin IVPB 600 milliGRAM(s) IV Intermittent every 8 hours  enoxaparin Injectable 40 milliGRAM(s) SubCutaneous daily    MEDICATIONS  (PRN):  acetaminophen   Tablet .. 650 milliGRAM(s) Oral every 6 hours PRN Temp greater or equal to 38C (100.4F), Mild Pain (1 - 3)  ALBUTerol/ipratropium for Nebulization. 3 milliLiter(s) Nebulizer every 6 hours PRN Shortness of Breath and/or Wheezing      __________________________________________________  REVIEW OF SYSTEMS:    CONSTITUTIONAL: No fever,   EYES: no acute visual disturbances  NECK: No pain or stiffness  RESPIRATORY: No cough; No shortness of breath  CARDIOVASCULAR: No chest pain, no palpitations  GASTROINTESTINAL: No pain. No nausea or vomiting; No diarrhea   NEUROLOGICAL: No headache or numbness, no tremors  MUSCULOSKELETAL: No joint pain, no muscle pain  GENITOURINARY: no dysuria, no frequency, no hesitancy  PSYCHIATRY: no depression , no anxiety  ALL OTHER  ROS negative        Vital Signs Last 24 Hrs  T(C): 37.2 (20 May 2019 04:56), Max: 37.2 (20 May 2019 04:56)  T(F): 98.9 (20 May 2019 04:56), Max: 98.9 (20 May 2019 04:56)  HR: 101 (20 May 2019 16:29) (90 - 101)  BP: 130/96 (20 May 2019 16:29) (130/96 - 133/89)  BP(mean): --  RR: 16 (20 May 2019 04:56) (16 - 16)  SpO2: 97% (20 May 2019 04:56) (97% - 98%)    ________________________________________________  PHYSICAL EXAM:  GENERAL: NAD  HEENT: Normocephalic;  conjunctivae and sclerae clear; moist mucous membranes;   NECK : supple  CHEST/LUNG: Clear to auscultation bilaterally with good air entry   HEART: S1 S2  regular; no murmurs, gallops or rubs  ABDOMEN: Soft, Nontender, Nondistended; Bowel sounds present  EXTREMITIES: no cyanosis; no edema; no calf tenderness  SKIN: warm and dry; no rash  NERVOUS SYSTEM:  Awake and alert; Oriented  to place, person and time ; no new deficits    _________________________________________________  LABS:              CAPILLARY BLOOD GLUCOSE    RADIOLOGY & ADDITIONAL TESTS:      MR Head w/wo IV Cont (05.20.19 @ 14:22) >  EXAM:  MR BRAIN WAW IC                            PROCEDURE DATE:  05/20/2019          INTERPRETATION:  CLINICAL INFORMATION: pre op clearance. Pt. s/p RTs to   cervical spine, now pre op fro mastectomy, needs neurology cleara.   ADMDIAG1: C50.919 MALIGNANT NEOPLASM OF UNSP SITE OF UNSPECIFIED FEMALE   BREAST/.    TECHNIQUE: Multiplanar, multisequence brain MRI was performed following   the administration of ml intravenous contrast.    COMPARISON: No similar prior studies available for comparison.    FINDINGS:    There is an area of encephalomalacia and gliosis in the right occipital   lobe and areas of T2/FLAIR hyperintensity in the superior cerebellum,   vermis, and right inferior cerebellar hemisphere. There are associated   small foci of nodular enhancement suggesting leptomeningeal/parenchymal   metastatic disease.     The ventricles are normal in size for patient's age.     There is no evidence of acute infarct. There are no foci of   susceptibility artifact to suggest hemorrhage.    Flow voids of the major intracranial vessels at the skull base follow   expected course and contour.    Mucosal thickening in the bilateral ethmoid sinuses.    The mastoids demonstrate no signal abnormality.     Bilateral orbits are within normal limits.    IMPRESSION:   An area of encephalomalacia and gliosis in the right occipital lobe and   areas of T2/FLAIR hyperintensity in the superior cerebellum, vermis, and   right inferior cerebellar hemisphere. There are associated small foci of   nodular enhancement suggesting leptomeningeal/parenchymal metastatic   disease.     < end of copied text >          Xray Chest 2 Views PA/Lat (05.20.19 @ 08:36) >  EXAM:  XR CHEST PA LAT 2V                            PROCEDURE DATE:  05/20/2019          INTERPRETATION:  Portable chest x-rays    Indication: Left pigtail chest tube.    Portable chest x-rays compared to a previous examination dated 5/18/2019.    Impression: Stable left pigtail chest tube.    Small left apical pneumothorax, slightly increased since the previous   examination.    No evidence for pleural effusion.    Left perihilar density has improved. New right infrahilar pulmonary   infiltrate.    Stable cardiac silhouette.    < end of copied text >        CT Chest w/ IV Cont (05.15.19 @ 23:55) >  EXAM:  CT CHEST IC                            PROCEDURE DATE:  05/15/2019          INTERPRETATION:  CLINICAL INFORMATION: Left breast mass, history of   carcinoma.    COMPARISON: None.    PROCEDURE:   CT of the Chest was performed with intravenous contrast.  90 ml of Omnipaque 350 was injected intravenously. 10 ml were discarded.  Sagittal and coronal reformats were performed.      FINDINGS:    LUNGS AND AIRWAYS AND PLEURA: Patent central airways.  Large left pleural   effusion with complete collapse of the left lower lobe and near complete   collapse of the left upper lobe. Calcified granuloma in the collapsed   left lower lobe. Smooth interlobular septal thickening at the right lung   base. Trace right pleural effusion.    MEDIASTINUM ANDHILA: Few mildly enlarged left paratracheal lymph nodes   measuring up to 1.3 cm. Shift of the mediastinum towards the right   secondary to the large left pleural effusion.    VESSELS: Within normal limits.    HEART: Heart size is normal. Trace pericardial fluid.    CHEST WALL AND LOWER NECK: Marked masslike thickening of the outer lower   quadrant of the left breast with underlying confluent breast mass   measuring up to 4.8 x 3.6 cm.    VISUALIZED UPPER ABDOMEN: Hepatic steatosis. Intrahepatic and extra   hepatic biliary ductal dilatation in the setting of a cholecystectomy.    BONES: Several sclerotic lesions within the spine most likely   representing bony metastatic disease. Mild compression of the T3 and T10   vertebral bodies and moderate compression of the T7 vertebral body likely   representing pathologic compression deformities.    IMPRESSION:     Marked masslike thickening of the outer lower quadrant of the left breast   with underlying confluent breast mass measuring up to 4.8x 3.6 cm   compatible with carcinoma.    Large left pleural effusion with complete collapse of the left lower lobe   and near complete collapse of the left upper lobe, presumably malignant   effusion.    Smooth interlobular septal thickening at the right lung base likely   reflective of mild interstitial edema. Trace right pleural effusion.    Few mildly enlarged left paratracheal lymph nodes measuring up to 1.3 cm.    Several sclerotic lesions within the spine most likely representing bony   metastatic disease. Compression deformities of T3, T7, and T10 likely   representing pathologic compression deformities.    Hepatic steatosis. Intrahepatic and extra hepatic biliary ductal   dilatation in the setting of a cholecystectomy.    < end of copied text >          Transthoracic Echocardiogram (05.20.19 @ 11:26) >    Patient name: TRISTAN NUNEZ  YOB: 1975   Age: 43 (F)   MR#: [id number]  Study Date: 5/20/2019  Location: [address] [place], [place] [address]Sonographer: Hao Melo RDCS  Study quality: Technically Difficult/Limited  Referring Physician:  RD ALMEIDA MD  Blood Pressure: 131/86 mmHg  Height: 162 cm  Weight: 63 kg  BSA: 1.7 m2  ------------------------------------------------------------------------    PROCEDURE: Transthoracic echocardiogram with 2-D, M-Mode  and complete spectral and color flow Doppler.  INDICATION: Dyspnea, unspecified (R06.00)  HISTORY:  ------------------------------------------------------------------------  DIMENSIONS:  Dimensions:     Normal Values:  LA:     2.3 cm    2.0 - 4.0 cm  Ao:     2.9 cm    2.0 - 3.8 cm  SEPTUM: 0.8 cm    0.6 - 1.2 cm  PWT:    0.8 cm    0.6 - 1.1 cm  LVIDd:  3.3 cm    3.0 - 5.6 cm  LVIDs:  2.4 cm    1.8 - 4.0 cm      Derived Variables:  LVMI: 41 g/m2  RWT: 0.48  Ejection Fraction Visual Estimate: 50-55 %    ------------------------------------------------------------------------  OBSERVATIONS:  Mitral Valve: Normal mitral valve.  Aortic Root: Aortic Root: 2.9 cm.  LVOT diameter: 1.8 cm.    Aortic Valve: Aortic valve not well seen.  Left Atrium: Normal left atrium.  Left Ventricle: Endocardium not well visualized; grossly  normal left ventricular systolic function. Normal left  ventricular internal dimensions and wall thicknesses. Grade  I diastolic dysfunction (Impaired relaxation).  Right Heart: Normal right atrium. Normal right ventricular  size and function. Normal tricuspid valve. Pulmonic valve  not well seen.  Pericardium/PleuraNormal pericardium with no pericardial  effusion.  Hemodynamic: Unable to estimate RVSP.  ------------------------------------------------------------------------  CONCLUSIONS:  1. Normal mitral valve.  2. Aortic valve not well seen.  3. Aortic Root: 2.9 cm.  4. Normal left atrium.  5. Normal left ventricular internal dimensions and wall  thicknesses.  6. Endocardium not well visualized; grossly normal left  ventricular systolic function.  7. Grade I diastolic dysfunction (Impaired relaxation).  8. Normal right atrium.  9. Normal right ventricular size and function.  10. Unable to estimate RVSP.  11. Normal tricuspid valve.  12. Pulmonic valvenot well seen.  13. Normal pericardium with no pericardial effusion.    < end of copied text >        Imaging Personally Reviewed:  YES/NO    Consultant(s) Notes Reviewed:   YES/ No    Care Discussed with Consultants :     Plan of care was discussed with patient and /or primary care giver; all questions and concerns were addressed and care was aligned with patient's wishes.

## 2019-05-20 NOTE — PROGRESS NOTE ADULT - ATTENDING COMMENTS
Agree with above  Left mastectomy when cleared by Medicine and Neurology
pt seen and  examined with ID resident

## 2019-05-21 ENCOUNTER — TRANSCRIPTION ENCOUNTER (OUTPATIENT)
Age: 44
End: 2019-05-21

## 2019-05-21 DIAGNOSIS — R63.0 ANOREXIA: ICD-10-CM

## 2019-05-21 DIAGNOSIS — C50.919 MALIGNANT NEOPLASM OF UNSPECIFIED SITE OF UNSPECIFIED FEMALE BREAST: ICD-10-CM

## 2019-05-21 DIAGNOSIS — J91.0 MALIGNANT PLEURAL EFFUSION: ICD-10-CM

## 2019-05-21 DIAGNOSIS — Z51.5 ENCOUNTER FOR PALLIATIVE CARE: ICD-10-CM

## 2019-05-21 DIAGNOSIS — Z71.89 OTHER SPECIFIED COUNSELING: ICD-10-CM

## 2019-05-21 LAB
ANION GAP SERPL CALC-SCNC: 10 MMOL/L — SIGNIFICANT CHANGE UP (ref 5–17)
BUN SERPL-MCNC: 6 MG/DL — LOW (ref 7–18)
CALCIUM SERPL-MCNC: 8.2 MG/DL — LOW (ref 8.4–10.5)
CHLORIDE SERPL-SCNC: 108 MMOL/L — SIGNIFICANT CHANGE UP (ref 96–108)
CO2 SERPL-SCNC: 22 MMOL/L — SIGNIFICANT CHANGE UP (ref 22–31)
CREAT SERPL-MCNC: 0.72 MG/DL — SIGNIFICANT CHANGE UP (ref 0.5–1.3)
CULTURE RESULTS: SIGNIFICANT CHANGE UP
CULTURE RESULTS: SIGNIFICANT CHANGE UP
GLUCOSE SERPL-MCNC: 89 MG/DL — SIGNIFICANT CHANGE UP (ref 70–99)
HCT VFR BLD CALC: 45.2 % — HIGH (ref 34.5–45)
HGB BLD-MCNC: 15.2 G/DL — SIGNIFICANT CHANGE UP (ref 11.5–15.5)
MCHC RBC-ENTMCNC: 30.8 PG — SIGNIFICANT CHANGE UP (ref 27–34)
MCHC RBC-ENTMCNC: 33.6 GM/DL — SIGNIFICANT CHANGE UP (ref 32–36)
MCV RBC AUTO: 91.5 FL — SIGNIFICANT CHANGE UP (ref 80–100)
NRBC # BLD: 0 /100 WBCS — SIGNIFICANT CHANGE UP (ref 0–0)
PLATELET # BLD AUTO: 290 K/UL — SIGNIFICANT CHANGE UP (ref 150–400)
POTASSIUM SERPL-MCNC: 3.1 MMOL/L — LOW (ref 3.5–5.3)
POTASSIUM SERPL-SCNC: 3.1 MMOL/L — LOW (ref 3.5–5.3)
RBC # BLD: 4.94 M/UL — SIGNIFICANT CHANGE UP (ref 3.8–5.2)
RBC # FLD: 13.2 % — SIGNIFICANT CHANGE UP (ref 10.3–14.5)
SODIUM SERPL-SCNC: 140 MMOL/L — SIGNIFICANT CHANGE UP (ref 135–145)
SPECIMEN SOURCE: SIGNIFICANT CHANGE UP
SPECIMEN SOURCE: SIGNIFICANT CHANGE UP
WBC # BLD: 6.89 K/UL — SIGNIFICANT CHANGE UP (ref 3.8–10.5)
WBC # FLD AUTO: 6.89 K/UL — SIGNIFICANT CHANGE UP (ref 3.8–10.5)

## 2019-05-21 PROCEDURE — 71045 X-RAY EXAM CHEST 1 VIEW: CPT | Mod: 26

## 2019-05-21 PROCEDURE — 99233 SBSQ HOSP IP/OBS HIGH 50: CPT | Mod: 57

## 2019-05-21 PROCEDURE — 99232 SBSQ HOSP IP/OBS MODERATE 35: CPT

## 2019-05-21 PROCEDURE — 99223 1ST HOSP IP/OBS HIGH 75: CPT

## 2019-05-21 RX ORDER — POTASSIUM CHLORIDE 20 MEQ
40 PACKET (EA) ORAL EVERY 4 HOURS
Refills: 0 | Status: COMPLETED | OUTPATIENT
Start: 2019-05-21 | End: 2019-05-21

## 2019-05-21 RX ORDER — LEVETIRACETAM 250 MG/1
500 TABLET, FILM COATED ORAL EVERY 12 HOURS
Refills: 0 | Status: DISCONTINUED | OUTPATIENT
Start: 2019-05-21 | End: 2019-05-22

## 2019-05-21 RX ORDER — CHLORHEXIDINE GLUCONATE 213 G/1000ML
1 SOLUTION TOPICAL DAILY
Refills: 0 | Status: COMPLETED | OUTPATIENT
Start: 2019-05-21 | End: 2019-05-22

## 2019-05-21 RX ADMIN — Medication 40 MILLIEQUIVALENT(S): at 16:55

## 2019-05-21 RX ADMIN — Medication 100 MILLIGRAM(S): at 23:03

## 2019-05-21 RX ADMIN — Medication 100 MILLIGRAM(S): at 05:50

## 2019-05-21 RX ADMIN — CHLORHEXIDINE GLUCONATE 1 APPLICATION(S): 213 SOLUTION TOPICAL at 23:09

## 2019-05-21 RX ADMIN — Medication 100 MILLIGRAM(S): at 13:39

## 2019-05-21 RX ADMIN — LEVETIRACETAM 420 MILLIGRAM(S): 250 TABLET, FILM COATED ORAL at 17:00

## 2019-05-21 RX ADMIN — Medication 40 MILLIEQUIVALENT(S): at 12:31

## 2019-05-21 RX ADMIN — ENOXAPARIN SODIUM 40 MILLIGRAM(S): 100 INJECTION SUBCUTANEOUS at 12:31

## 2019-05-21 NOTE — CONSULT NOTE ADULT - PROBLEM SELECTOR RECOMMENDATION 4
Spoke with patient regarding current condition, overall goals of care. She expressed that only she and her  are here in the [place]. [place] rest of [place], including [place]. She would trust her  to make medical decisions for her if she were unable. Regarding her condition, she hopes she will regain her appetite. She expressed that she has been tolerating treatments so far and wishes to continue all treatments at this time to be around for her children.  She has been trying to get them to come to the [place] but applying for visas has been difficult.    Support provided. Remains full code.

## 2019-05-21 NOTE — PROGRESS NOTE ADULT - SUBJECTIVE AND OBJECTIVE BOX
Patient is a 43y old  Female who presents with a chief complaint of Left breast pain and swelling (21 May 2019 08:39)    pt seen in icu [  ], reg med floor [ x  ], bed [x  ], chair at bedside [   ], a+o x3 [ x ], lethargic [  ],  nad [ x ]      left chest tube to pleurovac off suction [x]        Allergies    penicillin (Pruritus; Rash)        Vitals    T(F): 98.1 (05-21-19 @ 05:02), Max: 98.1 (05-20-19 @ 20:48)  HR: 90 (05-21-19 @ 05:02) (90 - 101)  BP: 106/61 (05-21-19 @ 05:02) (106/61 - 133/94)  RR: 18 (05-21-19 @ 05:02) (18 - 18)  SpO2: 99% (05-21-19 @ 05:02) (96% - 99%)  Wt(kg): --  CAPILLARY BLOOD GLUCOSE          Labs                          15.2   6.89  )-----------( 290      ( 21 May 2019 07:29 )             45.2       05-21    140  |  108  |  6<L>  ----------------------------<  89  3.1<L>   |  22  |  0.72    Ca    8.2<L>      21 May 2019 07:29              Pleural Fl  05-18 @ 03:26 --  --  --      .Body Fluid  05-18 @ 03:24   No growth  --    polymorphonuclear leukocytes seen  No organisms seen  by cytocentrifuge      .Blood  05-16 @ 15:10   No growth to date.  --  --          Radiology Results    < from: Xray Chest 1 View- PORTABLE-Routine (05.21.19 @ 10:30) >  IMPRESSION:  No pneumothorax.    < end of copied text >        < from: MR Head w/wo IV Cont (05.20.19 @ 14:22) >  IMPRESSION:   An area of encephalomalacia and gliosis in the right occipital lobe and   areas of T2/FLAIR hyperintensity in the superior cerebellum, vermis, and   right inferior cerebellar hemisphere. There are associated small foci of   nodular enhancement suggesting leptomeningeal/parenchymal metastatic   disease.     < end of copied text >          Meds    MEDICATIONS  (STANDING):  amLODIPine   Tablet 5 milliGRAM(s) Oral daily  clindamycin IVPB      clindamycin IVPB 600 milliGRAM(s) IV Intermittent every 8 hours  enoxaparin Injectable 40 milliGRAM(s) SubCutaneous daily  potassium chloride    Tablet ER 40 milliEquivalent(s) Oral every 4 hours      MEDICATIONS  (PRN):  acetaminophen   Tablet .. 650 milliGRAM(s) Oral every 6 hours PRN Temp greater or equal to 38C (100.4F), Mild Pain (1 - 3)  ALBUTerol/ipratropium for Nebulization. 3 milliLiter(s) Nebulizer every 6 hours PRN Shortness of Breath and/or Wheezing      Physical Exam      Neuro :  no focal deficits  Respiratory: improving left mid to lower lung breath sounds  CV: RRR, S1S2, no murmurs,   Abdominal: Soft, NT, ND +BS,  Extremities: No edema, + peripheral pulses        ASSESSMENT       left breast cellulitis,   r/o recurrence of breast ca with mets,   left pleural eff likely malignant,   hydropneumothorax  uncontrolled bp poss 2nd to pain,   h/o lung mets,   h/o nvasive ductal ca insitu( ER, [place] negative, Her2 positive),   s/p surgery in 2015, followed by chemotherapy, on maintainance hormonal therapy,   with questionable mets to C-spine and s/p radiation,   HTN      PLAN      pain control,   gi and dvt profilaxis,   supplement potassium for hypokalemia  s/p left pigtail by ir  thoracic surg f/u noted  keep left pigtail to water seal  change pleurovac as needed once it becomes full  pigtail output decreasing   chest xray with Near complete evacuation of left pleural effusion with a small apical pneumothorax noted above.  repeat cxr -ve for pneumothorax   pulm f/u  possible mastectomy by thoracic surgery w/ possible Plastic sx involvement     gen surg f/u  id f/u   cont clindamycin 600mgs iv q8hrs  cx neg noted above  f/u fluid cytology  heme-onc f/u  no plan to continue with systemic palliative chemotherapy during the inpt course  cardio eval for medical clearance  MRI brain noted +ve for mets   neuro cons for medical clearance  continue current meds

## 2019-05-21 NOTE — PROGRESS NOTE ADULT - ASSESSMENT
s/p L pigtail placed by IR 5/17 for left pleural effusion, pt with metastatic left breast ca    1- keep pigtail to lws  2- daily CXR  3- will f/u

## 2019-05-21 NOTE — PROGRESS NOTE ADULT - ASSESSMENT
1. Pleural Effusion  - Large Left pleural effusion with complete collapse of LLL and near complete collapse of NARCISO.   - S/P Pigtail; in place; drainage decreasing.   - R/O malignant effusion - F/U with pathology.   - Bronchodilators  - O2 Supplement  - F/U XR noted near complete evacuation.   - Continue to f/u with CXR.  -Thoracic surgery follow up  -Will need pleurodesis likely     2. Metastatic Breast CA   - Now with left breast fungating mass   - Planning for surgical removal.   - S/P chemo  - On hormonal therapy  - Spine Mets  - S/P radiation  - Heme/onc eval  - Surgical eval noted.   - ID eval noted.    - Abx for cellulitis of left breast   - Pain control.   - DVT and GI PPX

## 2019-05-21 NOTE — PROGRESS NOTE ADULT - SUBJECTIVE AND OBJECTIVE BOX
Surgery Pre-op Note    Preoperative Diagnosis: breast cancer    Planned Procedure: left mastectomy                        15.2   6.89  )-----------( 290      ( 21 May 2019 07:29 )             45.2       05-21    140  |  108  |  6<L>  ----------------------------<  89  3.1<L>   |  22  |  0.72    Ca    8.2<L>      21 May 2019 07:29    Type & Screen:     EKG: < from: 12 Lead ECG (05.15.19 @ 19:52) >  Diagnosis Line Sinus tachycardia  Low voltage QRS  Nonspecific T wave abnormality  Abnormal ECG    < end of copied text >    Echo: < from: Transthoracic Echocardiogram (05.20.19 @ 11:26) >  CONCLUSIONS:  1. Normal mitral valve.  2. Aortic valve not well seen.  3. Aortic Root: 2.9 cm.  4. Normal left atrium.  5. Normal left ventricular internal dimensions and wall  thicknesses.  6. Endocardium not well visualized; grossly normal left  ventricular systolic function.  7. Grade I diastolic dysfunction (Impaired relaxation).  8. Normal right atrium.  9. Normal right ventricular size and function.  10. Unable to estimate RVSP.  11. Normal tricuspid valve.  12. Pulmonic valvenot well seen.  13. Normal pericardium with no pericardial effusion.    ------------------------------------------------------------------------  Confirmed on  5/20/2019 - 16:45:32 by Jennifer Reed MD    < end of copied text >    CXR: < from: Xray Chest 1 View- PORTABLE-Routine (05.21.19 @ 10:30) >  IMPRESSION:  No pneumothorax.    Thank you for the courtesy of this referral.    < end of copied text >

## 2019-05-21 NOTE — PROGRESS NOTE ADULT - SUBJECTIVE AND OBJECTIVE BOX
INTERVAL HPI/OVERNIGHT EVENTS:    HEALTH ISSUES - PROBLEM Dx:  Prophylactic measure: Prophylactic measure  HTN (hypertension): HTN (hypertension)  Pleural effusion on left: Pleural effusion on left  Cellulitis, unspecified cellulitis site: Cellulitis, unspecified cellulitis site  Metastatic breast cancer: Metastatic breast cancer          MEDICATIONS  (STANDING):  amLODIPine   Tablet 5 milliGRAM(s) Oral daily  clindamycin IVPB      clindamycin IVPB 600 milliGRAM(s) IV Intermittent every 8 hours  enoxaparin Injectable 40 milliGRAM(s) SubCutaneous daily  potassium chloride    Tablet ER 40 milliEquivalent(s) Oral every 4 hours    MEDICATIONS  (PRN):  acetaminophen   Tablet .. 650 milliGRAM(s) Oral every 6 hours PRN Temp greater or equal to 38C (100.4F), Mild Pain (1 - 3)  ALBUTerol/ipratropium for Nebulization. 3 milliLiter(s) Nebulizer every 6 hours PRN Shortness of Breath and/or Wheezing      Allergies    penicillin (Pruritus; Rash)    Intolerances        REVIEW OF SYSTEMS    CONSTITUTIONAL: No fever,   EYES: no acute visual disturbances  NECK: No pain or stiffness  RESPIRATORY: No cough;++shortness of breath; pleural drainage tube draining yellow fluid  CARDIOVASCULAR: No chest pain, no palpitations  GASTROINTESTINAL: No pain. No nausea or vomiting; No diarrhea   NEUROLOGICAL: No headache or numbness, no tremors  MUSCULOSKELETAL: No joint pain, no muscle pain  GENITOURINARY: no dysuria, no frequency, no hesitancy  PSYCHIATRY: no depression , no anxiety  ALL OTHER  ROS negative      Vital Signs Last 24 Hrs  T(C): 36.7 (21 May 2019 05:02), Max: 36.7 (20 May 2019 20:48)  T(F): 98.1 (21 May 2019 05:02), Max: 98.1 (20 May 2019 20:48)  HR: 90 (21 May 2019 05:02) (90 - 101)  BP: 106/61 (21 May 2019 05:02) (106/61 - 133/94)  BP(mean): --  RR: 18 (21 May 2019 05:02) (18 - 18)  SpO2: 99% (21 May 2019 05:02) (96% - 99%)    PHYSICAL EXAM:      Neurological exam:  HF: A x O x 3. Appropriately interactive, normal affect. Speech fluent, No Aphasia or paraphasic errors. Naming /repetition intact   CN: DAVIE, EOMI, VF diminished left, facial sensation normal, no NLFD, tongue midline, Palate moves equally, SCM equal bilaterally  Motor: No pronator drift, Strength 5/5 in all 4 ext, normal bulk and tone, no tremor, rigidity or bradykinesia.    Sens: Intact to light touch / PP/ VS/ JS  asimultagnosia right and graphesthesia right  Reflexes: Symmetric and normal . BJ 2+, BR 2+, KJ 2+, AJ 2+, downgoing toes b/l  Coord:  No FNFA, dysmetria, NIKIA intact   Gait/Balance: Unsteady tandem    LABS:                        15.2   6.89  )-----------( 290      ( 21 May 2019 07:29 )             45.2     05-21    140  |  108  |  6<L>  ----------------------------<  89  3.1<L>   |  22  |  0.72    Ca    8.2<L>      21 May 2019 07:29            RADIOLOGY & ADDITIONAL TESTS:  < from: MR Head w/wo IV Cont (05.20.19 @ 14:22) >  EXAM:  MR BRAIN WAW IC                            PROCEDURE DATE:  05/20/2019          INTERPRETATION:  CLINICAL INFORMATION: pre op clearance. Pt. s/p RTs to   cervical spine, now pre op fro mastectomy, needs neurology cleara.   ADMDIAG1: C50.919 MALIGNANT NEOPLASM OF UNSP SITE OF UNSPECIFIED FEMALE   BREAST/.    TECHNIQUE: Multiplanar, multisequence brain MRI was performed following   the administration of ml intravenous contrast.    COMPARISON: No similar prior studies available for comparison.    FINDINGS:    There is an area of encephalomalacia and gliosis in the right occipital   lobe and areas of T2/FLAIR hyperintensity in the superior cerebellum,   vermis, and right inferior cerebellar hemisphere. There are associated   small foci of nodular enhancement suggesting leptomeningeal/parenchymal   metastatic disease.     The ventricles are normal in size for patient's age.     There is no evidence of acute infarct. There are no foci of   susceptibility artifact to suggest hemorrhage.    Flow voids of the major intracranial vessels at the skull base follow   expected course and contour.    Mucosal thickening in the bilateral ethmoid sinuses.    The mastoids demonstrate no signal abnormality.     Bilateral orbits are within normal limits.    IMPRESSION:   An area of encephalomalacia and gliosis in the right occipital lobe and   areas of T2/FLAIR hyperintensity in the superior cerebellum, vermis, and   right inferior cerebellar hemisphere. There are associated small foci of   nodular enhancement suggesting leptomeningeal/parenchymal metastatic   disease.       GISSELLE CEDENO   This document has been electronically signed. May 20 2019  4:11PM    < end of copied text >

## 2019-05-21 NOTE — PROGRESS NOTE ADULT - ASSESSMENT
Multiple intracranial T2 Flair enhancing lesions consistent with metastatic disease. Recommend Keppra 500 mg IV Q12H in the pre and post- operative period followed by PO Keppra. Safe to undergo surgery with slightly increased risk of cerebral  hemorrhage and seizures. Because there is no substantial mass effect, dexamethasone is not indicated.

## 2019-05-21 NOTE — PROGRESS NOTE ADULT - SUBJECTIVE AND OBJECTIVE BOX
43y Female with no overnight complaints  L pigtail cath to lws, no air leak    Vital Signs:  T(C): 36.7 (05-21-19 @ 05:02), Max: 36.7 (05-20-19 @ 20:48)  HR: 90 (05-21-19 @ 05:02) (90 - 101)  BP: 106/61 (05-21-19 @ 05:02) (106/61 - 133/94)  RR: 18 (05-21-19 @ 05:02) (18 - 18)  SpO2: 99% (05-21-19 @ 05:02) (96% - 99%)  Wt(kg): --    Physical Exam:  General: NAD, comfortable  L pigtail: to lws, 248cc serosang, no air leak    Ins/Outs:    05-20 @ 07:01  -  05-21 @ 07:00  --------------------------------------------------------  IN:  Total IN: 0 mL    OUT:    Chest Tube: 248 mL  Total OUT: 248 mL    Total NET: -248 mL      < from: Xray Chest 2 Views PA/Lat (05.20.19 @ 08:36) >  Impression: Stable left pigtail chest tube.    Small left apical pneumothorax, slightly increased since the previous   examination.    No evidence for pleural effusion.    Left perihilar density has improved. New right infrahilar pulmonary   infiltrate.    Stable cardiac silhouette.    < end of copied text >    < from: MR Head w/wo IV Cont (05.20.19 @ 14:22) >  IMPRESSION:   An area of encephalomalacia and gliosis in the right occipital lobe and   areas of T2/FLAIR hyperintensity in the superior cerebellum, vermis, and   right inferior cerebellar hemisphere. There are associated small foci of   nodular enhancement suggesting leptomeningeal/parenchymal metastatic   disease.     < end of copied text >

## 2019-05-21 NOTE — PROGRESS NOTE ADULT - ASSESSMENT
43 female with PMH of invasive ductal ca insitu( ER, [place] negative, Her2 positive), s/p surgery in 2015, followed by chemotherapy, on maintainance hormonal therapy, with questionable mets to C-spine and s/p radiation, HTN, comes in with complaint of Left breast pain and mass *2 weeks. Patient had sudden onset pain and then fast growing mass and swelling with involvement of overlying skin. Denies any fever, chills, nausea, vomiting , abdominal pain, nipple discharge.   Admits to have exertional sob. Patient is being followed up on op basis by  who did surgery and Dr. Brantley.     In Ed, BP: 174/107 mm hg, HR: 110, Temp: 100.2 F  cbc wnl   bmp shows k of 3.2   CT shows complete white out of left lung   Marked masslike thickening of the outer lower quadrant of the left breast with underlying confluent breast mass measuring up to 4.8 x 3.6 cm compatible with carcinoma.  Large left pleural effusion with complete collapse of the left lower lobe and near complete collapse of the left upper lobe, presumably malignant effusion.  Several sclerotic lesions within the spine most likely representing bony metastatic disease. Compression deformities of T3, T7, and T10 likely representing pathologic compression deformities.    Will admit to medicine for Left breast mass, likely overlying cellulitis, Malignant pleural effusion.     Pt. seen and examined.  Pt. reports being comfortable except occasional left breast pain which resolves with meds.  Pt. with no c/o CP, SOB or other discomfort.

## 2019-05-21 NOTE — CONSULT NOTE ADULT - SUBJECTIVE AND OBJECTIVE BOX
HPI:  43 female with PMH of invasive ductal ca insitu( ER, [place] negative, Her2 positive), s/p surgery in 2015, followed by chemotherapy, on maintainance hormonal therapy, with questionable mets to C-spine and s/p radiation, HTN, comes in with complaint of Left breast pain and mass *2 weeks. Patient had sudden onset pain and then fast growing mass and swelling with involvement of overlying skin. Denies any fever, chills, nausea, vomiting , abdominal pain, nipple discharge.   Admits to have exertional sob. Patient is being followed up on op basis by  who did surgery and Dr. Brantley.     In Ed, BP: 174/107 mm hg, HR: 110, Temp: 100.2 F  cbc wnl   bmp shows k of 3.2   CT shows complete white out of left lung   Marked masslike thickening of the outer lower quadrant of the left breast with underlying confluent breast mass measuring up to 4.8 x 3.6 cm compatible with carcinoma.  Large left pleural effusion with complete collapse of the left lower lobe and near complete collapse of the left upper lobe, presumably malignant effusion.  Several sclerotic lesions within the spine most likely representing bony metastatic disease. Compression deformities of T3, T7, and T10 likely representing pathologic compression deformities. (16 May 2019 03:48)    Interval history: For palliative L mastectomy tomorrow. Denies pain or SOB at present. Reports poor appetite since several weeks. only eating broths, liquids.       PAST MEDICAL & SURGICAL HISTORY:  Breast CA  History of hypertension  S/P appendectomy  H/O:   S/P cholecystectomy      SOCIAL HISTORY:  , has 2 children in [facility]  Admitted from:  home    Substance abuse history:    denies          Tobacco hx:    denies              Alcohol hx:  denies            Preferred Language: Urdu    Surrogate/HCP/Guardian:     Luis A James       Phone#: [phone]    FAMILY HISTORY:  No pertinent family history in first degree relatives    Baseline ADLs (prior to admission): ambulatory, alert    Allergies    penicillin (Pruritus; Rash)    Intolerances      Present Symptoms:   Dyspnea: denies  Nausea/Vomiting: mild  Anxiety: denies  Depressed: denies  Fatigue: dnies  Loss of appetite: +  Pain:       denies   Review of Systems: All others negative      MEDICATIONS  (STANDING):  amLODIPine   Tablet 5 milliGRAM(s) Oral daily  chlorhexidine 2% Cloths 1 Application(s) Topical daily  clindamycin IVPB      clindamycin IVPB 600 milliGRAM(s) IV Intermittent every 8 hours  enoxaparin Injectable 40 milliGRAM(s) SubCutaneous daily  levETIRAcetam  IVPB 500 milliGRAM(s) IV Intermittent every 12 hours    MEDICATIONS  (PRN):  acetaminophen   Tablet .. 650 milliGRAM(s) Oral every 6 hours PRN Temp greater or equal to 38C (100.4F), Mild Pain (1 - 3)  ALBUTerol/ipratropium for Nebulization. 3 milliLiter(s) Nebulizer every 6 hours PRN Shortness of Breath and/or Wheezing      PHYSICAL EXAM:    Vital Signs Last 24 Hrs  T(C): 37.4 (21 May 2019 14:00), Max: 37.4 (21 May 2019 14:00)  T(F): 99.4 (21 May 2019 14:00), Max: 99.4 (21 May 2019 14:00)  HR: 100 (21 May 2019 14:00) (90 - 100)  BP: 140/100 (21 May 2019 14:00) (106/61 - 140/100)  BP(mean): --  RR: 18 (21 May 2019 14:00) (18 - 18)  SpO2: 98% (21 May 2019 14:00) (96% - 99%)     Karnofsky Performance Score/Palliative Performance Status Version2: 60 %     GENERAL: alert, NAD  HEENT: Atraumatic, oropharynx clear, neck supple  CHEST/LUNG: unlabored, L chest tube in place  HEART: Regular rate and rhythm    ABDOMEN: Soft, Nontender, Nondistended   MUSCULOSKELETAL:  No  edema   NERVOUS SYSTEM:  Alert & Oriented X3,  follows commands  SKIN: fungating L breast mass  Oral intake: poor       LABS:                        15.2   6.89  )-----------( 290      ( 21 May 2019 07:29 )             45.2     05-21    140  |  108  |  6<L>  ----------------------------<  89  3.1<L>   |  22  |  0.72    Ca    8.2<L>      21 May 2019 07:29          RADIOLOGY & ADDITIONAL STUDIES:    ADVANCE DIRECTIVES:

## 2019-05-21 NOTE — PROGRESS NOTE ADULT - SUBJECTIVE AND OBJECTIVE BOX
Patient is a 43y old  Female who presents with a chief complaint of Left breast pain and swelling (21 May 2019 12:27)       Pt is seen and examined  pt is awake and out of bed to chair  pt seems comfortable and denies any complaints at this time  s/p left chest tube insertion with relief of SOB          ROS:  Negative except for:    MEDICATIONS  (STANDING):  amLODIPine   Tablet 5 milliGRAM(s) Oral daily  clindamycin IVPB      clindamycin IVPB 600 milliGRAM(s) IV Intermittent every 8 hours  enoxaparin Injectable 40 milliGRAM(s) SubCutaneous daily  potassium chloride    Tablet ER 40 milliEquivalent(s) Oral every 4 hours    MEDICATIONS  (PRN):  acetaminophen   Tablet .. 650 milliGRAM(s) Oral every 6 hours PRN Temp greater or equal to 38C (100.4F), Mild Pain (1 - 3)  ALBUTerol/ipratropium for Nebulization. 3 milliLiter(s) Nebulizer every 6 hours PRN Shortness of Breath and/or Wheezing      Allergies    penicillin (Pruritus; Rash)    Intolerances        Vital Signs Last 24 Hrs  T(C): 36.7 (21 May 2019 05:02), Max: 36.7 (20 May 2019 20:48)  T(F): 98.1 (21 May 2019 05:02), Max: 98.1 (20 May 2019 20:48)  HR: 90 (21 May 2019 05:02) (90 - 101)  BP: 106/61 (21 May 2019 05:02) (106/61 - 133/94)  BP(mean): --  RR: 18 (21 May 2019 05:02) (18 - 18)  SpO2: 99% (21 May 2019 05:02) (96% - 99%)    PHYSICAL EXAM  General: adult in NAD  HEENT: clear oropharynx, anicteric sclera, pink conjunctiva  Neck: supple  CV: normal S1/S2 with no murmur rubs or gallops  Breast: Fungating left breast mass  Lungs: positive air movement b/l ant lungs,clear to auscultation, no wheezes, no rales  Abdomen: soft non-tender non-distended, no hepatosplenomegaly  Ext: no clubbing cyanosis or edema  Skin: no rashes and no petechiae  Neuro: alert and oriented X 4, no focal deficits  LABS:                          15.2   6.89  )-----------( 290      ( 21 May 2019 07:29 )             45.2         Mean Cell Volume : 91.5 fl  Mean Cell Hemoglobin : 30.8 pg  Mean Cell Hemoglobin Concentration : 33.6 gm/dL  Auto Neutrophil # : x  Auto Lymphocyte # : x  Auto Monocyte # : x  Auto Eosinophil # : x  Auto Basophil # : x  Auto Neutrophil % : x  Auto Lymphocyte % : x  Auto Monocyte % : x  Auto Eosinophil % : x  Auto Basophil % : x    Serial CBC's  05-21 @ 07:29  Hct-45.2 / Hgb-15.2 / Plat-290 / RBC-4.94 / WBC-6.89            05-21    140  |  108  |  6<L>  ----------------------------<  89  3.1<L>   |  22  |  0.72    Ca    8.2<L>      21 May 2019 07:29            WBC Count: 6.89 K/uL (05-21-19 @ 07:29)  Hemoglobin: 15.2 g/dL (05-21-19 @ 07:29)  Hematocrit: 45.2 % (05-21-19 @ 07:29)  Platelet Count - Automated: 290 K/uL (05-21-19 @ 07:29)  WBC Count: 6.62 K/uL (05-17-19 @ 08:13)  Hemoglobin: 15.2 g/dL (05-17-19 @ 08:13)  Hematocrit: 44.6 % (05-17-19 @ 08:13)  Platelet Count - Automated: 243 K/uL (05-17-19 @ 08:13)  WBC Count: 7.01 K/uL (05-16-19 @ 10:41)  Hemoglobin: 14.3 g/dL (05-16-19 @ 10:41)  Hematocrit: 42.8 % (05-16-19 @ 10:41)  Platelet Count - Automated: 221 K/uL (05-16-19 @ 10:41)  WBC Count: 6.68 K/uL (05-15-19 @ 22:36)  Hemoglobin: 14.8 g/dL (05-15-19 @ 22:36)  Hematocrit: 44.2 % (05-15-19 @ 22:36)  Platelet Count - Automated: 227 K/uL (05-15-19 @ 22:36)                BLOOD SMEAR INTERPRETATION:       RADIOLOGY & ADDITIONAL STUDIES:

## 2019-05-21 NOTE — CONSULT NOTE ADULT - PROBLEM SELECTOR RECOMMENDATION 9
metastatic to brain, spine, s/p whole brain xrt, on palliative chemo, currently on hold. Foll by Dr Brantley.   For L palliative mastectomy for fungating L breast mass tomorrow. Denies pain at present.

## 2019-05-21 NOTE — PROGRESS NOTE ADULT - SUBJECTIVE AND OBJECTIVE BOX
Patient is a 43y old  Female who presents with a chief complaint of Left breast pain and swelling (21 May 2019 11:54)  Awake, alert, comfortable in bed in NAD. Still with right pig tail cath to low suction.    INTERVAL HPI/OVERNIGHT EVENTS:      VITAL SIGNS:  T(F): 98.1 (05-21-19 @ 05:02)  HR: 90 (05-21-19 @ 05:02)  BP: 106/61 (05-21-19 @ 05:02)  RR: 18 (05-21-19 @ 05:02)  SpO2: 99% (05-21-19 @ 05:02)  Wt(kg): --  I&O's Detail    20 May 2019 07:01  -  21 May 2019 07:00  --------------------------------------------------------  IN:  Total IN: 0 mL    OUT:    Chest Tube: 248 mL  Total OUT: 248 mL    Total NET: -248 mL              REVIEW OF SYSTEMS:    CONSTITUTIONAL:  No fevers, chills, sweats    HEENT:  Eyes:  No diplopia or blurred vision. ENT:  No earache, sore throat or runny nose.    CARDIOVASCULAR:  No pressure, squeezing, tightness, or heaviness about the chest; no palpitations.    RESPIRATORY:  Per HPI    GASTROINTESTINAL:  No abdominal pain, nausea, vomiting or diarrhea.    GENITOURINARY:  No dysuria, frequency or urgency.    NEUROLOGIC:  No paresthesias, fasciculations, seizures or weakness.    PSYCHIATRIC:  No disorder of thought or mood.      PHYSICAL EXAM:    Constitutional: Well developed and nourished  Eyes:Perrla  ENMT: normal  Neck:supple  Respiratory: good air entry  Cardiovascular: S1 S2 regular  Gastrointestinal: Soft, Non tender  Extremities: No edema  Vascular:normal  Neurological:Awake, alert,Ox3  Musculoskeletal:Normal      MEDICATIONS  (STANDING):  amLODIPine   Tablet 5 milliGRAM(s) Oral daily  clindamycin IVPB      clindamycin IVPB 600 milliGRAM(s) IV Intermittent every 8 hours  enoxaparin Injectable 40 milliGRAM(s) SubCutaneous daily  potassium chloride    Tablet ER 40 milliEquivalent(s) Oral every 4 hours    MEDICATIONS  (PRN):  acetaminophen   Tablet .. 650 milliGRAM(s) Oral every 6 hours PRN Temp greater or equal to 38C (100.4F), Mild Pain (1 - 3)  ALBUTerol/ipratropium for Nebulization. 3 milliLiter(s) Nebulizer every 6 hours PRN Shortness of Breath and/or Wheezing      Allergies    penicillin (Pruritus; Rash)    Intolerances        LABS:                        15.2   6.89  )-----------( 290      ( 21 May 2019 07:29 )             45.2     05-21    140  |  108  |  6<L>  ----------------------------<  89  3.1<L>   |  22  |  0.72    Ca    8.2<L>      21 May 2019 07:29                CAPILLARY BLOOD GLUCOSE            RADIOLOGY & ADDITIONAL TESTS:    CXR:  < from: Xray Chest 1 View- PORTABLE-Routine (05.21.19 @ 10:30) >  INTERPRETATION:  Chest one view    HISTORY: Left pigtail chest tube    COMPARISON STUDY: 5/20/2019    Frontal expiratory view of the chest shows theheart to be similar in   size. Left base pigtail chest tube is present. The lungs show similar   small consolidation along the left heart border and there is no evidence   of pneumothorax nor pleural effusion.    IMPRESSION:  No pneumothorax.    Thank you for the courtesy of this referral.    < end of copied text >    Ct scan chest:    ekg;    echo:

## 2019-05-21 NOTE — PROGRESS NOTE ADULT - ASSESSMENT
43 year old female with breast cancer and left fungating breast mass. Left pigtail chest tube in place due to left pleural effusion.     - OR 5/22 for left mastectomy  - NPO after midnight  - preop labs pending  - Neurology clearance appreciated, please start Keppra per neurology recommendations  - medical optimization   - consent to be obtained  - discussed with medical team 43 year old female with breast cancer and left fungating breast mass. Left pigtail chest tube in place due to left pleural effusion.     - OR 5/22 for left mastectomy  - NPO after midnight  - preop labs pending  - chlorhexidine wipes   - Neurology clearance appreciated, please start Keppra per neurology recommendations  - medical optimization   - consent to be obtained  - discussed with medical team

## 2019-05-21 NOTE — CONSULT NOTE ADULT - PROBLEM SELECTOR RECOMMENDATION 3
2/2 metastatic breast ca, albumin 2.7, weight loss, moderate protein-calorie malnutrition. Recommend nutrition eval, nutrition supplement.

## 2019-05-21 NOTE — PROGRESS NOTE ADULT - ASSESSMENT
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 s/p whole brain radiation, HTN, comes in with complaint of Left breast pain and SOB due to probable malignant pleural effusion    Problem #1 ONC - patient with known stage IV H2N positive breast ca on palliative anti-neoplastic tx  -appreciate CT surgery and pulmonary inputs  -pt will benefit from therapeutic thoracentesis - performed  with possible pleurodesis - at a later date  -optimize pain control and obtain inpt supportive care service input for GOC discussion and symptoms management  -no plan to continue with systemic palliative chemotherapy during the inpt course  -Neuro evaluation for clearance prior to palliative mastectomy    WIll contine to follow; call with questions; d/w CT surgery, NP and Dr. Navarrete

## 2019-05-22 ENCOUNTER — RESULT REVIEW (OUTPATIENT)
Age: 44
End: 2019-05-22

## 2019-05-22 LAB
ANION GAP SERPL CALC-SCNC: 9 MMOL/L — SIGNIFICANT CHANGE UP (ref 5–17)
APTT BLD: 37.2 SEC — HIGH (ref 27.5–36.3)
BLD GP AB SCN SERPL QL: SIGNIFICANT CHANGE UP
BUN SERPL-MCNC: 7 MG/DL — SIGNIFICANT CHANGE UP (ref 7–18)
CALCIUM SERPL-MCNC: 7.9 MG/DL — LOW (ref 8.4–10.5)
CHLORIDE SERPL-SCNC: 112 MMOL/L — HIGH (ref 96–108)
CO2 SERPL-SCNC: 20 MMOL/L — LOW (ref 22–31)
CREAT SERPL-MCNC: 0.65 MG/DL — SIGNIFICANT CHANGE UP (ref 0.5–1.3)
GLUCOSE SERPL-MCNC: 82 MG/DL — SIGNIFICANT CHANGE UP (ref 70–99)
HCG SERPL-ACNC: <1 MIU/ML — SIGNIFICANT CHANGE UP
HCT VFR BLD CALC: 46.8 % — HIGH (ref 34.5–45)
HGB BLD-MCNC: 15.5 G/DL — SIGNIFICANT CHANGE UP (ref 11.5–15.5)
INR BLD: 1.07 RATIO — SIGNIFICANT CHANGE UP (ref 0.88–1.16)
MCHC RBC-ENTMCNC: 30.6 PG — SIGNIFICANT CHANGE UP (ref 27–34)
MCHC RBC-ENTMCNC: 33.1 GM/DL — SIGNIFICANT CHANGE UP (ref 32–36)
MCV RBC AUTO: 92.5 FL — SIGNIFICANT CHANGE UP (ref 80–100)
NRBC # BLD: 0 /100 WBCS — SIGNIFICANT CHANGE UP (ref 0–0)
PLATELET # BLD AUTO: 282 K/UL — SIGNIFICANT CHANGE UP (ref 150–400)
POTASSIUM SERPL-MCNC: 3.6 MMOL/L — SIGNIFICANT CHANGE UP (ref 3.5–5.3)
POTASSIUM SERPL-SCNC: 3.6 MMOL/L — SIGNIFICANT CHANGE UP (ref 3.5–5.3)
PROTHROM AB SERPL-ACNC: 11.9 SEC — SIGNIFICANT CHANGE UP (ref 10–12.9)
RBC # BLD: 5.06 M/UL — SIGNIFICANT CHANGE UP (ref 3.8–5.2)
RBC # FLD: 13.5 % — SIGNIFICANT CHANGE UP (ref 10.3–14.5)
SODIUM SERPL-SCNC: 141 MMOL/L — SIGNIFICANT CHANGE UP (ref 135–145)
WBC # BLD: 6.58 K/UL — SIGNIFICANT CHANGE UP (ref 3.8–10.5)
WBC # FLD AUTO: 6.58 K/UL — SIGNIFICANT CHANGE UP (ref 3.8–10.5)

## 2019-05-22 PROCEDURE — 19303 MAST SIMPLE COMPLETE: CPT | Mod: AS,LT

## 2019-05-22 PROCEDURE — 88342 IMHCHEM/IMCYTCHM 1ST ANTB: CPT | Mod: 26

## 2019-05-22 PROCEDURE — 88341 IMHCHEM/IMCYTCHM EA ADD ANTB: CPT | Mod: 26

## 2019-05-22 PROCEDURE — 19303 MAST SIMPLE COMPLETE: CPT | Mod: LT

## 2019-05-22 RX ORDER — ONDANSETRON 8 MG/1
4 TABLET, FILM COATED ORAL EVERY 6 HOURS
Refills: 0 | Status: DISCONTINUED | OUTPATIENT
Start: 2019-05-22 | End: 2019-05-24

## 2019-05-22 RX ORDER — ENOXAPARIN SODIUM 100 MG/ML
40 INJECTION SUBCUTANEOUS DAILY
Refills: 0 | Status: DISCONTINUED | OUTPATIENT
Start: 2019-05-22 | End: 2019-05-24

## 2019-05-22 RX ORDER — NALOXONE HYDROCHLORIDE 4 MG/.1ML
0.1 SPRAY NASAL
Refills: 0 | Status: DISCONTINUED | OUTPATIENT
Start: 2019-05-22 | End: 2019-05-24

## 2019-05-22 RX ORDER — LEVETIRACETAM 250 MG/1
500 TABLET, FILM COATED ORAL EVERY 12 HOURS
Refills: 0 | Status: DISCONTINUED | OUTPATIENT
Start: 2019-05-22 | End: 2019-05-24

## 2019-05-22 RX ORDER — IPRATROPIUM/ALBUTEROL SULFATE 18-103MCG
3 AEROSOL WITH ADAPTER (GRAM) INHALATION EVERY 6 HOURS
Refills: 0 | Status: DISCONTINUED | OUTPATIENT
Start: 2019-05-22 | End: 2019-05-24

## 2019-05-22 RX ORDER — AMLODIPINE BESYLATE 2.5 MG/1
5 TABLET ORAL DAILY
Refills: 0 | Status: DISCONTINUED | OUTPATIENT
Start: 2019-05-22 | End: 2019-05-24

## 2019-05-22 RX ORDER — SODIUM CHLORIDE 9 MG/ML
1000 INJECTION INTRAMUSCULAR; INTRAVENOUS; SUBCUTANEOUS
Refills: 0 | Status: DISCONTINUED | OUTPATIENT
Start: 2019-05-22 | End: 2019-05-22

## 2019-05-22 RX ORDER — HYDROMORPHONE HYDROCHLORIDE 2 MG/ML
0.5 INJECTION INTRAMUSCULAR; INTRAVENOUS; SUBCUTANEOUS
Refills: 0 | Status: DISCONTINUED | OUTPATIENT
Start: 2019-05-22 | End: 2019-05-22

## 2019-05-22 RX ORDER — ACETAMINOPHEN 500 MG
650 TABLET ORAL EVERY 6 HOURS
Refills: 0 | Status: DISCONTINUED | OUTPATIENT
Start: 2019-05-22 | End: 2019-05-24

## 2019-05-22 RX ORDER — HYDROMORPHONE HYDROCHLORIDE 2 MG/ML
30 INJECTION INTRAMUSCULAR; INTRAVENOUS; SUBCUTANEOUS
Refills: 0 | Status: DISCONTINUED | OUTPATIENT
Start: 2019-05-22 | End: 2019-05-24

## 2019-05-22 RX ADMIN — HYDROMORPHONE HYDROCHLORIDE 30 MILLILITER(S): 2 INJECTION INTRAMUSCULAR; INTRAVENOUS; SUBCUTANEOUS at 19:45

## 2019-05-22 RX ADMIN — Medication 100 MILLIGRAM(S): at 14:26

## 2019-05-22 RX ADMIN — AMLODIPINE BESYLATE 5 MILLIGRAM(S): 2.5 TABLET ORAL at 06:39

## 2019-05-22 RX ADMIN — LEVETIRACETAM 420 MILLIGRAM(S): 250 TABLET, FILM COATED ORAL at 06:39

## 2019-05-22 RX ADMIN — LEVETIRACETAM 420 MILLIGRAM(S): 250 TABLET, FILM COATED ORAL at 20:00

## 2019-05-22 RX ADMIN — Medication 100 MILLIGRAM(S): at 05:00

## 2019-05-22 RX ADMIN — HYDROMORPHONE HYDROCHLORIDE 30 MILLILITER(S): 2 INJECTION INTRAMUSCULAR; INTRAVENOUS; SUBCUTANEOUS at 21:15

## 2019-05-22 RX ADMIN — CHLORHEXIDINE GLUCONATE 1 APPLICATION(S): 213 SOLUTION TOPICAL at 13:03

## 2019-05-22 NOTE — PROGRESS NOTE ADULT - PROBLEM SELECTOR PROBLEM 2
Cellulitis, unspecified cellulitis site

## 2019-05-22 NOTE — PROGRESS NOTE ADULT - SUBJECTIVE AND OBJECTIVE BOX
Patient is a 43y old  Female who presents with a chief complaint of Left breast pain and swelling (22 May 2019 10:24)  awake, alert, comfortable in bed in NAD. Awaiting for Breast Surgery today. Remains with left pig tail cath.    INTERVAL HPI/OVERNIGHT EVENTS:      VITAL SIGNS:  T(F): 98.1 (05-22-19 @ 05:32)  HR: 94 (05-22-19 @ 05:32)  BP: 134/89 (05-22-19 @ 05:32)  RR: 18 (05-22-19 @ 05:32)  SpO2: 98% (05-22-19 @ 05:32)  Wt(kg): --  I&O's Detail    21 May 2019 07:01  -  22 May 2019 07:00  --------------------------------------------------------  IN:  Total IN: 0 mL    OUT:    Chest Tube: 140 mL  Total OUT: 140 mL    Total NET: -140 mL              REVIEW OF SYSTEMS:    CONSTITUTIONAL:  No fevers, chills, sweats    HEENT:  Eyes:  No diplopia or blurred vision. ENT:  No earache, sore throat or runny nose.    CARDIOVASCULAR:  No pressure, squeezing, tightness, or heaviness about the chest; no palpitations.    RESPIRATORY:  Per HPI    GASTROINTESTINAL:  No abdominal pain, nausea, vomiting or diarrhea.    GENITOURINARY:  No dysuria, frequency or urgency.    NEUROLOGIC:  No paresthesias, fasciculations, seizures or weakness.    PSYCHIATRIC:  No disorder of thought or mood.      PHYSICAL EXAM:    Constitutional: Well developed and nourished  Eyes:Perrla  ENMT: normal  Neck:supple  Respiratory: good air entry  Cardiovascular: S1 S2 regular  Gastrointestinal: Soft, Non tender  Extremities: No edema  Vascular:normal  Neurological:Awake, alert,Ox3  Musculoskeletal:Normal      MEDICATIONS  (STANDING):  amLODIPine   Tablet 5 milliGRAM(s) Oral daily  chlorhexidine 2% Cloths 1 Application(s) Topical daily  clindamycin IVPB      clindamycin IVPB 600 milliGRAM(s) IV Intermittent every 8 hours  enoxaparin Injectable 40 milliGRAM(s) SubCutaneous daily  levETIRAcetam  IVPB 500 milliGRAM(s) IV Intermittent every 12 hours    MEDICATIONS  (PRN):  acetaminophen   Tablet .. 650 milliGRAM(s) Oral every 6 hours PRN Temp greater or equal to 38C (100.4F), Mild Pain (1 - 3)  ALBUTerol/ipratropium for Nebulization. 3 milliLiter(s) Nebulizer every 6 hours PRN Shortness of Breath and/or Wheezing      Allergies    penicillin (Pruritus; Rash)    Intolerances        LABS:                        15.5   6.58  )-----------( 282      ( 22 May 2019 10:40 )             46.8     05-22    141  |  112<H>  |  7   ----------------------------<  82  3.6   |  20<L>  |  0.65    Ca    7.9<L>      22 May 2019 10:40      PT/INR - ( 22 May 2019 10:40 )   PT: 11.9 sec;   INR: 1.07 ratio         PTT - ( 22 May 2019 10:40 )  PTT:37.2 sec          CAPILLARY BLOOD GLUCOSE            RADIOLOGY & ADDITIONAL TESTS:    CXR:  < from: Xray Chest 1 View- PORTABLE-Routine (05.21.19 @ 10:30) >  COMPARISON STUDY: 5/20/2019    Frontal expiratory view of the chest shows theheart to be similar in   size. Left base pigtail chest tube is present. The lungs show similar   small consolidation along the left heart border and there is no evidence   of pneumothorax nor pleural effusion.    IMPRESSION:  No pneumothorax.    < end of copied text >    Ct scan chest:    ekg;    echo:

## 2019-05-22 NOTE — CHART NOTE - NSCHARTNOTEFT_GEN_A_CORE
Pt POD 0 s/p L mastectomy  resting comfortably  no n/v  voided  PCA for pain control  CT to suction  Vital Signs Last 24 Hrs  T(C): 36.8 (22 May 2019 21:00), Max: 36.8 (22 May 2019 21:00)  T(F): 98.2 (22 May 2019 21:00), Max: 98.2 (22 May 2019 21:00)  HR: 90 (22 May 2019 21:00) (88 - 97)  BP: 126/83 (22 May 2019 21:00) (125/87 - 146/91)  BP(mean): 93 (22 May 2019 21:00) (93 - 105)  RR: 19 (22 May 2019 21:00) (16 - 20)  SpO2: 100% (22 May 2019 21:00) (97% - 100%)    L breast dressing CDI  JPs- 13, 35cc ss    stable post-op

## 2019-05-22 NOTE — PACU DISCHARGE NOTE - PAIN:
History: The patient presents the office for evaluation of chronic sore throat or the past several weeks. She denies any previous history of issues with her throat in the past. She also describes fatigue as well. She has had extensive evaluation including laboratory studies which have been negative. She did have a Monospot test however not EBV titers. She is overall feeling slightly better each day. She denies hot potato voice or trismus. There is no pain with movement of her head.    Exam: Neck are symmetrical. Voice is normal. There is no trismus. Ears and nose were clear. Oral cavity revealed mild erythema however no evidence of exudate. Peritonsillar space was normal. Posterior frontal wall was normal. Palpation of the neck revealed no lymphadenopathy.    Testing: We will plan to perform EBV titers as her history is somewhat consistent with mononucleosis.    Assessment: Chronic sore throat. We will test for mono.    Plan: Obtain EBV titers and I will call her with results when they are available. For now she will continue local measures. There is no evidence of bacterial infection.   Controlled with current regime

## 2019-05-22 NOTE — PROGRESS NOTE ADULT - PROBLEM SELECTOR PLAN 3
Pigtail cath to suction intact  -CXR->small left apical pneumothorax slightly increased since previous examination.  No evidence for pleural effusion.  Left perihilar density has improved.  New right intrahilar pulmonary infiltrate.  -As per thoracic surgery, pigtail cath to remain in place until post surgical procedure  5/21  -Pigtail remains intact with decreasing but persisting drainage  -Pt. denies SOB
Pigtail cath to suction intact  -CXR->small left apical pneumothorax slightly increased since previous examination.  No evidence for pleural effusion.  Left perihilar density has improved.  New right intrahilar pulmonary infiltrate.  -As per thoracic surgery, pigtail cath to remain in place until post surgical procedure  5/21  -Pigtail remains intact with decreasing but persisting drainage  -Pt. denies SOB
Pigtail cath to suction intact  -CXR->small left apical pneumothorax slightly increased since previous examination.  No evidence for pleural effusion.  Left perihilar density has improved.  New right intrahilar pulmonary infiltrate.  -As per thoracic surgery, pigtail cath to remain in place until post surgical procedure.        < end of copied text >            < end of copied text >

## 2019-05-22 NOTE — PROGRESS NOTE ADULT - PROBLEM SELECTOR PLAN 5
-Hem/onc Dr. Brantley following, note appreciated  -Inpt supportive care service input for GOC discussion and symptoms management recommended  -Discussed with attending  -Palliative care Dr. James consulted  -
-Hem/onc Dr. Brantley following, note appreciated  -Inpt supportive care service input for GOC discussion and symptoms management recommended  -Discussed with attending  -Palliative care Dr. James consulted
IMPROVE VTE Individual Risk Assessment          RISK                                                          Points  [  ] Previous VTE                                                3  [  ] Thrombophilia                                             2  [  ] Lower limb paralysis                                   2        (unable to hold up >15 seconds)    [  ] Current Cancer                                             2         (within 6 months)  [ x ] Immobilization > 24 hrs                              1  [  ] ICU/CCU stay > 24 hours                             1  [ x ] Age > 60                                                         1    IMPROVE VTE Score: 2   Levonox for dvt ppx   Hold till procedure is figured out in am

## 2019-05-22 NOTE — PROGRESS NOTE ADULT - PROBLEM SELECTOR PLAN 4
unable to obtain home meds  primary team to obtain

## 2019-05-22 NOTE — PROGRESS NOTE ADULT - SUBJECTIVE AND OBJECTIVE BOX
Condition discussed with patient and , options risks and benefits explained.  Questions answered.  For palliative simple left mastectomy, possible skin graft, today.

## 2019-05-22 NOTE — PROGRESS NOTE ADULT - ASSESSMENT
1. Pleural Effusion  - S/p Large Left pleural effusion with complete collapse of LLL and near complete collapse of NARCISO.   - S/P Pigtail; in place; drainage decreasing.   - R/O malignant effusion - F/U with pathology.   - Bronchodilators  - O2 Supplement  - F/U XR noted near complete evacuation.   - Continue to f/u with CXR.  -Thoracic surgery follow up  -Will  likely need pleurodesis      2. Metastatic Breast CA   - Now with left breast fungating mass   - Planning for surgical removal.   - S/P chemo  - On hormonal therapy  - Spine Mets  - S/P radiation  - Heme/onc eval  - Surgical eval noted.   - ID eval noted.    - Abx for cellulitis of left breast   - Pain control.   - DVT and GI PPX

## 2019-05-22 NOTE — PROGRESS NOTE ADULT - SUBJECTIVE AND OBJECTIVE BOX
NP Note discussed with  Primary Attending    Patient is a 43y old  Female who presents with a chief complaint of Left breast pain and swelling (22 May 2019 12:00)      INTERVAL HPI/OVERNIGHT EVENTS: no new complaints    MEDICATIONS  (STANDING):    MEDICATIONS  (PRN):      __________________________________________________  REVIEW OF SYSTEMS:    CONSTITUTIONAL: No fever,   EYES: no acute visual disturbances  NECK: No pain or stiffness  RESPIRATORY: No cough; No shortness of breath  CARDIOVASCULAR: No chest pain, no palpitations  GASTROINTESTINAL: No pain. No nausea or vomiting; No diarrhea   NEUROLOGICAL: No headache or numbness, no tremors  MUSCULOSKELETAL: No joint pain, no muscle pain  GENITOURINARY: no dysuria, no frequency, no hesitancy  PSYCHIATRY: no depression , no anxiety  ALL OTHER  ROS negative        Vital Signs Last 24 Hrs  T(C): 36.6 (22 May 2019 13:54), Max: 36.7 (22 May 2019 05:32)  T(F): 97.9 (22 May 2019 13:54), Max: 98.1 (22 May 2019 05:32)  HR: 97 (22 May 2019 13:54) (94 - 103)  BP: 125/87 (22 May 2019 13:54) (125/87 - 148/86)  BP(mean): 97 (22 May 2019 13:54) (97 - 97)  RR: 18 (22 May 2019 13:54) (18 - 18)  SpO2: 97% (22 May 2019 13:54) (97% - 98%)    ________________________________________________  PHYSICAL EXAM:  GENERAL: NAD  HEENT: Normocephalic;  conjunctivae and sclerae clear; moist mucous membranes;   NECK : supple  CHEST/LUNG: Clear to auscultation bilaterally with good air entry   HEART: S1 S2  regular; no murmurs, gallops or rubs  ABDOMEN: Soft, Nontender, Nondistended; Bowel sounds present  EXTREMITIES: no cyanosis; no edema; no calf tenderness  SKIN: warm and dry; no rash  NERVOUS SYSTEM:  Awake and alert; Oriented  to place, person and time ; no new deficits    _________________________________________________  LABS:                        15.5   6.58  )-----------( 282      ( 22 May 2019 10:40 )             46.8     05-22    141  |  112<H>  |  7   ----------------------------<  82  3.6   |  20<L>  |  0.65    Ca    7.9<L>      22 May 2019 10:40      PT/INR - ( 22 May 2019 10:40 )   PT: 11.9 sec;   INR: 1.07 ratio         PTT - ( 22 May 2019 10:40 )  PTT:37.2 sec    CAPILLARY BLOOD GLUCOSE            RADIOLOGY & ADDITIONAL TESTS:    Imaging Personally Reviewed:  YES/NO    Consultant(s) Notes Reviewed:   YES/ No    Care Discussed with Consultants :     Plan of care was discussed with patient and /or primary care giver; all questions and concerns were addressed and care was aligned with patient's wishes.

## 2019-05-22 NOTE — PROGRESS NOTE ADULT - PROBLEM SELECTOR PLAN 6
IMPROVE VTE Individual Risk Assessment          RISK                                                          Points  [  ] Previous VTE                                                3  [  ] Thrombophilia                                             2  [  ] Lower limb paralysis                                   2        (unable to hold up >15 seconds)    [  ] Current Cancer                                             2         (within 6 months)  [ x ] Immobilization > 24 hrs                              1  [  ] ICU/CCU stay > 24 hours                             1  [ x ] Age > 60                                                         1    IMPROVE VTE Score: 2   Levonox for dvt ppx   Hold till procedure is figured out in am
IMPROVE VTE Individual Risk Assessment          RISK                                                          Points  [  ] Previous VTE                                                3  [  ] Thrombophilia                                             2  [  ] Lower limb paralysis                                   2        (unable to hold up >15 seconds)    [  ] Current Cancer                                             2         (within 6 months)  [ x ] Immobilization > 24 hrs                              1  [  ] ICU/CCU stay > 24 hours                             1  [ x ] Age > 60                                                         1    IMPROVE VTE Score: 2   Levonox for dvt ppx   Hold till procedure is figured out in am

## 2019-05-22 NOTE — PROGRESS NOTE ADULT - PROBLEM SELECTOR PLAN 2
Fungating left breast mass with overlying cellulitis  -ID Dr Lawrence  -Cont Clindamycin  5/21  -Pt. remains afebrile with no leukocytosis  5/21  -Left breast cellulitis almost resolved  -Cont Clindamycin till post op then d/c all Abx per ID
Fungating left breast mass with overlying cellulitis  -ID Dr Lawrence  -Cont Clindamycin      Will obtain blood cx   Tylenol prn  Consulted ID, Dr. Lawrence
Fungating left breast mass with overlying cellulitis  -ID Dr Lawrence  -Cont Clindamycin  5/21  -Pt. remains afebrile with no leukocytosis  -Left breast cellulitis almost resolved  -Cont Clindamycin till post op then d/c all Abx per ID  5/22  -D/C Abx post operatively per ID

## 2019-05-22 NOTE — PROGRESS NOTE ADULT - PROBLEM SELECTOR PLAN 1
Left mastectomy pending neuro and medicine clearance.  -Neuro Dr. Brown consulted  -Brain MRI as requested by neuro, as above  -ECHO unremarkable, EF 50-55%  5/21  -Brain MRI with multiple enhancing lesions consistent with metastatic disease.  -Cleared for mastectomy by Dr. Brown  -Start Keppra 500mg IV q12hrs pre and post operative period followed by PO Keppra per neuro  -As per hem/onc, no plan to continue with systemic palliative chemo during inpt course
Left mastectomy pending neuro and medicine clearance.  -Neuro Dr. Brown consulted  -Brain MRI as requested by neuro, as above  -ECHO unremarkable, EF 50-55%
Left mastectomy pending neuro and medicine clearance.  -Neuro Dr. Brown consulted  -Brain MRI as requested by neuro, as above  -ECHO unremarkable, EF 50-55%  5/21  -Brain MRI with multiple enhancing lesions consistent with metastatic disease.  -Cleared for mastectomy by Dr. Brown  -Start Keppra 500mg IV q12hrs pre and post operative period followed by PO Keppra per neuro  -As per hem/onc, no plan to continue with systemic palliative chemo during inpt course  5/22  -Pt. for palliative left breast mastectomy today  -Cont Keppra 500 mg IV q12 hrs post operatively, transition to PO Keppra

## 2019-05-22 NOTE — PROGRESS NOTE ADULT - SUBJECTIVE AND OBJECTIVE BOX
Patient is a 43y old  Female who presents with a chief complaint of Left breast pain and swelling (21 May 2019 17:40)    pt seen in icu [  ], reg med floor [   ], bed [  ], chair at bedside [   ], a+o x3 [  ], lethargic [  ],  nad [  ]    murray [  ], ngt [  ], peg [  ], et tube [  ], cent line [  ], picc line [  ]        Allergies    penicillin (Pruritus; Rash)        Vitals    T(F): 98.1 (19 @ 05:32), Max: 99.4 (19 @ 14:00)  HR: 94 (19 @ 05:32) (94 - 103)  BP: 134/89 (19 @ 05:32) (131/88 - 148/86)  RR: 18 (19 @ 05:32) (18 - 18)  SpO2: 98% (19 @ 05:32) (97% - 98%)  Wt(kg): --  CAPILLARY BLOOD GLUCOSE          Labs                          15.2   6.89  )-----------( 290      ( 21 May 2019 07:29 )             45.2           140  |  108  |  6<L>  ----------------------------<  89  3.1<L>   |  22  |  0.72    Ca    8.2<L>      21 May 2019 07:29              Pleural Fl   @ 03:26 --  --  --      .Body Fluid   @ 03:24   No growth  --    polymorphonuclear leukocytes seen  No organisms seen  by cytocentrifuge      .Blood   @ 15:10   No growth at 5 days.  --  --          Radiology Results      Meds    MEDICATIONS  (STANDING):  amLODIPine   Tablet 5 milliGRAM(s) Oral daily  chlorhexidine 2% Cloths 1 Application(s) Topical daily  clindamycin IVPB      clindamycin IVPB 600 milliGRAM(s) IV Intermittent every 8 hours  enoxaparin Injectable 40 milliGRAM(s) SubCutaneous daily  levETIRAcetam  IVPB 500 milliGRAM(s) IV Intermittent every 12 hours      MEDICATIONS  (PRN):  acetaminophen   Tablet .. 650 milliGRAM(s) Oral every 6 hours PRN Temp greater or equal to 38C (100.4F), Mild Pain (1 - 3)  ALBUTerol/ipratropium for Nebulization. 3 milliLiter(s) Nebulizer every 6 hours PRN Shortness of Breath and/or Wheezing      Physical Exam    Neuro :  no focal deficits  Respiratory: CTA B/L  CV: RRR, S1S2, no murmurs,   Abdominal: Soft, NT, ND +BS,  Extremities: No edema, + peripheral pulses    ASSESSMENT    Malignant neoplasm of female breast  Breast CA  History of hypertension  S/P appendectomy  H/O:   S/P cholecystectomy      PLAN Patient is a 43y old  Female who presents with a chief complaint of Left breast pain and swelling (21 May 2019 17:40)    pt seen in icu [  ], reg med floor [ x  ], bed [x  ], chair at bedside [   ], a+o x3 [ x ], lethargic [  ],  nad [ x ]      left chest tube to pleurovac        Allergies    penicillin (Pruritus; Rash)        Vitals    T(F): 98.1 (05-22-19 @ 05:32), Max: 99.4 (05-21-19 @ 14:00)  HR: 94 (05-22-19 @ 05:32) (94 - 103)  BP: 134/89 (05-22-19 @ 05:32) (131/88 - 148/86)  RR: 18 (05-22-19 @ 05:32) (18 - 18)  SpO2: 98% (05-22-19 @ 05:32) (97% - 98%)  Wt(kg): --  CAPILLARY BLOOD GLUCOSE          Labs                          15.2   6.89  )-----------( 290      ( 21 May 2019 07:29 )             45.2       05-21    140  |  108  |  6<L>  ----------------------------<  89  3.1<L>   |  22  |  0.72    Ca    8.2<L>      21 May 2019 07:29              Pleural Fl  05-18 @ 03:26 --  --  --      .Body Fluid  05-18 @ 03:24   No growth  --    polymorphonuclear leukocytes seen  No organisms seen  by cytocentrifuge      .Blood  05-16 @ 15:10   No growth at 5 days.  --  --          Radiology Results      Meds    MEDICATIONS  (STANDING):  amLODIPine   Tablet 5 milliGRAM(s) Oral daily  chlorhexidine 2% Cloths 1 Application(s) Topical daily  clindamycin IVPB      clindamycin IVPB 600 milliGRAM(s) IV Intermittent every 8 hours  enoxaparin Injectable 40 milliGRAM(s) SubCutaneous daily  levETIRAcetam  IVPB 500 milliGRAM(s) IV Intermittent every 12 hours      MEDICATIONS  (PRN):  acetaminophen   Tablet .. 650 milliGRAM(s) Oral every 6 hours PRN Temp greater or equal to 38C (100.4F), Mild Pain (1 - 3)  ALBUTerol/ipratropium for Nebulization. 3 milliLiter(s) Nebulizer every 6 hours PRN Shortness of Breath and/or Wheezing      Physical Exam      Neuro :  no focal deficits  Respiratory: improving left mid to lower lung breath sounds  CV: RRR, S1S2, no murmurs,   Abdominal: Soft, NT, ND +BS,  Extremities: No edema, + peripheral pulses        ASSESSMENT       left breast cellulitis,   r/o recurrence of breast ca with mets,   left pleural eff likely malignant,   hydropneumothorax  uncontrolled bp poss 2nd to pain,   h/o lung mets,   h/o nvasive ductal ca insitu( ER, [place] negative, Her2 positive),   s/p surgery in 2015, followed by chemotherapy, on maintainance hormonal therapy,   with questionable mets to C-spine and s/p radiation,   HTN      PLAN      pain control,   gi and dvt profilaxis,   supplement potassium for hypokalemia  s/p left pigtail by ir  thoracic surg f/u noted  keep left pigtail to water seal  change pleurovac as needed once it becomes full  pigtail output decreasing   chest xray with Near complete evacuation of left pleural effusion with a small apical pneumothorax noted   repeat cxr -ve for pneumothorax   pulm f/u  pt for left mastectomy today  gen surg f/u  id f/u   cont clindamycin 600mgs iv q8hrs  cx neg noted above  f/u fluid cytology  heme-onc f/u  no plan to continue with systemic palliative chemotherapy during the inpt course  MRI brain noted +ve for mets   neuro cons noted  Multiple intracranial T2 Flair enhancing lesions consistent with metastatic disease.   cont Keppra 500 mg IV Q12H in the pre and post- operative period followed by PO Keppra.   pt is Safe to undergo surgery with slightly increased risk of cerebral  hemorrhage and seizures.  Because there is no substantial mass effect, dexamethasone is not indicated.  continue current meds

## 2019-05-23 LAB
ANION GAP SERPL CALC-SCNC: 8 MMOL/L — SIGNIFICANT CHANGE UP (ref 5–17)
BUN SERPL-MCNC: 12 MG/DL — SIGNIFICANT CHANGE UP (ref 7–18)
CALCIUM SERPL-MCNC: 7.1 MG/DL — LOW (ref 8.4–10.5)
CHLORIDE SERPL-SCNC: 111 MMOL/L — HIGH (ref 96–108)
CO2 SERPL-SCNC: 20 MMOL/L — LOW (ref 22–31)
CREAT SERPL-MCNC: 0.53 MG/DL — SIGNIFICANT CHANGE UP (ref 0.5–1.3)
CULTURE RESULTS: SIGNIFICANT CHANGE UP
GLUCOSE SERPL-MCNC: 117 MG/DL — HIGH (ref 70–99)
HCT VFR BLD CALC: 42.7 % — SIGNIFICANT CHANGE UP (ref 34.5–45)
HGB BLD-MCNC: 14 G/DL — SIGNIFICANT CHANGE UP (ref 11.5–15.5)
MCHC RBC-ENTMCNC: 30.6 PG — SIGNIFICANT CHANGE UP (ref 27–34)
MCHC RBC-ENTMCNC: 32.8 GM/DL — SIGNIFICANT CHANGE UP (ref 32–36)
MCV RBC AUTO: 93.4 FL — SIGNIFICANT CHANGE UP (ref 80–100)
NON-GYNECOLOGICAL CYTOLOGY STUDY: SIGNIFICANT CHANGE UP
NRBC # BLD: 0 /100 WBCS — SIGNIFICANT CHANGE UP (ref 0–0)
PLATELET # BLD AUTO: 273 K/UL — SIGNIFICANT CHANGE UP (ref 150–400)
POTASSIUM SERPL-MCNC: 3.9 MMOL/L — SIGNIFICANT CHANGE UP (ref 3.5–5.3)
POTASSIUM SERPL-SCNC: 3.9 MMOL/L — SIGNIFICANT CHANGE UP (ref 3.5–5.3)
RBC # BLD: 4.57 M/UL — SIGNIFICANT CHANGE UP (ref 3.8–5.2)
RBC # FLD: 13.2 % — SIGNIFICANT CHANGE UP (ref 10.3–14.5)
SODIUM SERPL-SCNC: 139 MMOL/L — SIGNIFICANT CHANGE UP (ref 135–145)
SPECIMEN SOURCE: SIGNIFICANT CHANGE UP
WBC # BLD: 8.18 K/UL — SIGNIFICANT CHANGE UP (ref 3.8–10.5)
WBC # FLD AUTO: 8.18 K/UL — SIGNIFICANT CHANGE UP (ref 3.8–10.5)

## 2019-05-23 RX ADMIN — AMLODIPINE BESYLATE 5 MILLIGRAM(S): 2.5 TABLET ORAL at 05:12

## 2019-05-23 RX ADMIN — LEVETIRACETAM 420 MILLIGRAM(S): 250 TABLET, FILM COATED ORAL at 05:12

## 2019-05-23 RX ADMIN — Medication 100 MILLIGRAM(S): at 13:02

## 2019-05-23 RX ADMIN — ENOXAPARIN SODIUM 40 MILLIGRAM(S): 100 INJECTION SUBCUTANEOUS at 11:09

## 2019-05-23 RX ADMIN — LEVETIRACETAM 420 MILLIGRAM(S): 250 TABLET, FILM COATED ORAL at 17:14

## 2019-05-23 RX ADMIN — Medication 100 MILLIGRAM(S): at 05:12

## 2019-05-23 RX ADMIN — HYDROMORPHONE HYDROCHLORIDE 30 MILLILITER(S): 2 INJECTION INTRAMUSCULAR; INTRAVENOUS; SUBCUTANEOUS at 07:09

## 2019-05-23 RX ADMIN — Medication 100 MILLIGRAM(S): at 21:40

## 2019-05-23 RX ADMIN — HYDROMORPHONE HYDROCHLORIDE 30 MILLILITER(S): 2 INJECTION INTRAMUSCULAR; INTRAVENOUS; SUBCUTANEOUS at 19:13

## 2019-05-23 NOTE — CONSULT NOTE ADULT - PROVIDER SPECIALTY LIST ADULT
Heme/Onc
Infectious Disease
Neurology
Pain Medicine
Pulmonology
Surgery
Palliative Care
Thoracic Surgery

## 2019-05-23 NOTE — PROGRESS NOTE ADULT - ASSESSMENT
1. Pleural Effusion  - S/p Large Left pleural effusion with complete collapse of LLL and near complete collapse of NARCISO.   - S/P Pigtail; in place; drainage decreasing.   - R/O malignant effusion - F/U with pathology.   - Bronchodilators  - O2 Supplement  - F/U XR noted near complete evacuation.   - Continue to f/u with CXR.  - Thoracic surgery follow up  - Will likely need pleurodesis      2. Metastatic Breast CA   - Now with left breast fungating mass   - Planning for surgical removal.   - S/P chemo  - On hormonal therapy  - Spine Mets  - S/P radiation  - Heme/onc eval  - Surgical eval noted.   - ID eval noted.    - Abx for cellulitis of left breast   - Pain control.   - DVT and GI PPX 1. Pleural Effusion  - S/p Large Left pleural effusion with complete collapse of LLL and near complete collapse of NARCISO.   - S/P Pigtail; in place; drainage decreasing.   - R/O malignant effusion - F/U with pathology.   - Bronchodilators  - O2 Supplement  - F/U XR noted near complete evacuation.   - Continue to f/u with CXR.  - Thoracic surgery follow up  - Will likely need pleurodesis      2. Metastatic Breast CA   - S/p mastectomy  - S/P chemo  - On hormonal therapy  - Spine Mets  - S/P radiation  - Heme/onc eval  - Surgical follow up  - ID follow up   - Pain control.   - DVT and GI PPX

## 2019-05-23 NOTE — PROGRESS NOTE ADULT - ASSESSMENT
43 female with PMH of invasive ductal ca  (ER, [place] negative, Her2 positive), s/p surgery in 2015, on anti H2N chemotherapy, with  mets to C-spine and brain s/p seizure and s/p whole brain radiation, HTN, comes in with complaint of Left breast pain and SOB due to probable malignant pleural effusion    Problem #1 ONC - patient with known stage IV H2N positive breast ca on palliative anti-neoplastic tx  -appreciate CT surgery and pulmonary inputs  -pt is  benefitting from therapeutic thoracentesis - performed  with possible pleurodesis - at a later date  -optimize pain control and obtain inpt supportive care service input for GOC discussion and symptoms management  -no plan to continue with systemic palliative chemotherapy during the inpt course  - s/p palliative mastectomy  -await cytology results from thoracentesis  WIll continue to follow; call with questions; d/w CT surgery, NP and Dr. Navarrete

## 2019-05-23 NOTE — PROGRESS NOTE ADULT - SUBJECTIVE AND OBJECTIVE BOX
Patient is a 43y old  Female who presents with a chief complaint of Left breast pain and swelling (23 May 2019 10:57)    pt seen in icu [  ], reg med floor [ x  ], bed [x  ], chair at bedside [   ], a+o x3 [ x ], lethargic [  ],  nad [ x ]      left chest tube to pleurovac,   sony x 2         Allergies    penicillin (Pruritus; Rash)        Vitals    T(F): 97.7 (05-23-19 @ 05:12), Max: 98.2 (05-22-19 @ 21:00)  HR: 84 (05-23-19 @ 05:12) (84 - 97)  BP: 115/84 (05-23-19 @ 05:12) (112/74 - 146/91)  RR: 17 (05-23-19 @ 05:12) (16 - 20)  SpO2: 98% (05-23-19 @ 05:12) (97% - 100%)  Wt(kg): --  CAPILLARY BLOOD GLUCOSE          Labs                          14.0   8.18  )-----------( 273      ( 23 May 2019 06:17 )             42.7       05-23    139  |  111<H>  |  12  ----------------------------<  117<H>  3.9   |  20<L>  |  0.53    Ca    7.1<L>      23 May 2019 06:17              Pleural Fl  05-18 @ 03:26   Culture is being performed.  --  --      .Body Fluid  05-18 @ 03:24   No growth  --    polymorphonuclear leukocytes seen  No organisms seen  by cytocentrifuge      .Blood  05-16 @ 15:10   No growth at 5 days.  --  --          Radiology Results      Meds    MEDICATIONS  (STANDING):  amLODIPine   Tablet 5 milliGRAM(s) Oral daily  clindamycin IVPB 600 milliGRAM(s) IV Intermittent every 8 hours  clindamycin IVPB      enoxaparin Injectable 40 milliGRAM(s) SubCutaneous daily  HYDROmorphone PCA (1 mG/mL) 30 milliLiter(s) PCA Continuous PCA Continuous  levETIRAcetam  IVPB 500 milliGRAM(s) IV Intermittent every 12 hours      MEDICATIONS  (PRN):  acetaminophen   Tablet .. 650 milliGRAM(s) Oral every 6 hours PRN Temp greater or equal to 38C (100.4F), Mild Pain (1 - 3)  ALBUTerol/ipratropium for Nebulization. 3 milliLiter(s) Nebulizer every 6 hours PRN Shortness of Breath and/or Wheezing  naloxone Injectable 0.1 milliGRAM(s) IV Push every 3 minutes PRN For ANY of the following changes in patient status:  A. RR LESS THAN 10 breaths per minute, B. Oxygen saturation LESS THAN 90%, C. Sedation score of 6  ondansetron Injectable 4 milliGRAM(s) IV Push every 6 hours PRN Nausea      Physical Exam      Neuro :  no focal deficits  Respiratory: improving left mid to lower lung breath sounds  CV: RRR, S1S2, no murmurs,   Abdominal: Soft, NT, ND +BS,  Extremities: No edema, + peripheral pulses  +ve dressing at operative site c/d/i, sony x 2 with seroussanginous fluid         ASSESSMENT       left breast cellulitis,   r/o recurrence of breast ca with mets,   left pleural eff likely malignant,   hydropneumothorax  uncontrolled bp poss 2nd to pain,   h/o lung mets,   h/o nvasive ductal ca insitu( ER, [place] negative, Her2 positive),   s/p surgery in 2015, followed by chemotherapy, on maintainance hormonal therapy,   with questionable mets to C-spine and s/p radiation,   HTN      PLAN      pain control,   gi and dvt profilaxis,   s/p left pigtail by ir  thoracic surg f/u noted  keep left pigtail to water seal  change pleurovac as needed once it becomes full  pigtail output decreasing   chest xray with Near complete evacuation of left pleural effusion with a small apical pneumothorax noted   repeat cxr -ve for pneumothorax   pulm f/u  s/p left mastectomy   gen surg f/u  id f/u   cont clindamycin 600mgs iv q8hrs  cx neg noted above  f/u fluid cytology  heme-onc f/u  no plan to continue with systemic palliative chemotherapy during the inpt course  MRI brain noted +ve for mets   neuro cons noted  Multiple intracranial T2 Flair enhancing lesions consistent with metastatic disease.   cont Keppra 500 mg IV Q12H in the pre and post- operative period followed by PO Keppra.   pt is Safe to undergo surgery with slightly increased risk of cerebral  hemorrhage and seizures.  Because there is no substantial mass effect, dexamethasone is not indicated.  continue current meds

## 2019-05-23 NOTE — PROGRESS NOTE ADULT - SUBJECTIVE AND OBJECTIVE BOX
s/p Left Mastectomy 5/22, POD #1      Patient examined at bedside, not having much pain  Denies dyspnea  No nausea, no vomiting  Tolerating diet        T(F): 97.7 (05-23-19 @ 05:12), Max: 98.2 (05-22-19 @ 21:00)  HR: 84 (05-23-19 @ 05:12) (84 - 97)  BP: 115/84 (05-23-19 @ 05:12) (112/74 - 146/91)  RR: 17 (05-23-19 @ 05:12) (16 - 20)  SpO2: 98% (05-23-19 @ 05:12) (97% - 100%)        05-22 @ 07:01  -  05-23 @ 07:00  --------------------------------------------------------  IN:    IV PiggyBack: 100 mL    Sodium Chloride 0.9% IV Bolus: 600 mL  Total IN: 700 mL    OUT:    Bulb: 38 mL    Bulb: 55 mL    Estimated Blood Loss: 50 mL  Total OUT: 143 mL    Total NET: 557 mL          Physical Exam  General: AAOx3, No acute distress  Skin: No jaundice, no icterus  Chest: L operative site is minimally tender, no induration or fluctuance, no hematoma, dressing is clean & intact, ROSENDA x2 to bulb suction with serosanguineous output, pigtail in L chest wall   Extremities: non edematous, no calf pain bilaterally

## 2019-05-23 NOTE — PROGRESS NOTE ADULT - SUBJECTIVE AND OBJECTIVE BOX
INTERVAL HPI/OVERNIGHT EVENTS:  Pt stable.   Tolerating diet.   flatus/BM.  C/O minimal pain at surgery site    Vital Signs Last 24 Hrs  T(C): 36.5 (23 May 2019 05:12), Max: 36.8 (22 May 2019 21:00)  T(F): 97.7 (23 May 2019 05:12), Max: 98.2 (22 May 2019 21:00)  HR: 84 (23 May 2019 05:12) (84 - 97)  BP: 115/84 (23 May 2019 05:12) (112/74 - 146/91)  BP(mean): 93 (22 May 2019 21:00) (93 - 105)  RR: 17 (23 May 2019 05:12) (16 - 20)  SpO2: 98% (23 May 2019 05:12) (97% - 100%)    Physical:  Dressing intact  Blakes serosanguinous drainage  Abdomen: Soft nondistended, nontender.    I&O's Summary    22 May 2019 07:01  -  23 May 2019 07:00  --------------------------------------------------------  IN: 700 mL / OUT: 143 mL / NET: 557 mL        LABS:                        14.0   8.18  )-----------( 273      ( 23 May 2019 06:17 )             42.7             05-23    139  |  111<H>  |  12  ----------------------------<  117<H>  3.9   |  20<L>  |  0.53    Ca    7.1<L>      23 May 2019 06:17

## 2019-05-23 NOTE — PROGRESS NOTE ADULT - ASSESSMENT
42 y/o female with invasive L Breast Cancer s/p L Mastectomy; L Pleural Effusion      1. Regular Diet  2. DVT PPx  3. Out of bed  4. Dr. Camarillo to follow up regarding pigtail recommendations Referred To Otolaryngology For Closure Text (Leave Blank If You Do Not Want): After obtaining clear surgical margins the patient was sent to otolaryngology for surgical repair.  The patient understands they will receive post-surgical care and follow-up from the referring physician's office.

## 2019-05-23 NOTE — CONSULT NOTE ADULT - SUBJECTIVE AND OBJECTIVE BOX
Chief Complaint:    HPI:  43 female with PMH of invasive ductal ca insitu( ER, [place] negative, Her2 positive), s/p surgery in 2015, followed by chemotherapy, on maintainance hormonal therapy, with questionable mets to C-spine and s/p radiation, HTN, comes in with complaint of Left breast pain and mass *2 weeks. Patient had sudden onset pain and then fast growing mass and swelling with involvement of overlying skin. Denies any fever, chills, nausea, vomiting , abdominal pain, nipple discharge.   Admits to have exertional sob. Patient is being followed up on op basis by  who did surgery and Dr. Brantley.     In Ed, BP: 174/107 mm hg, HR: 110, Temp: 100.2 F  cbc wnl   bmp shows k of 3.2   CT shows complete white out of left lung   Marked masslike thickening of the outer lower quadrant of the left breast with underlying confluent breast mass measuring up to 4.8 x 3.6 cm compatible with carcinoma.  Large left pleural effusion with complete collapse of the left lower lobe and near complete collapse of the left upper lobe, presumably malignant effusion.  Several sclerotic lesions within the spine most likely representing bony metastatic disease. Compression deformities of T3, T7, and T10 likely representing pathologic compression deformities. (16 May 2019 03:48)      Pain Level:   Scale: VAS    PAST MEDICAL & SURGICAL HISTORY:  Breast CA  History of hypertension  S/P appendectomy  H/O:   S/P cholecystectomy      FAMILY HISTORY:  No pertinent family history in first degree relatives      SOCIAL HISTORY:  [ ] Denies Smoking, Alcohol, or Drug Use    Allergies    penicillin (Pruritus; Rash)    Intolerances        PAIN MEDICATIONS:  acetaminophen   Tablet .. 650 milliGRAM(s) Oral every 6 hours PRN  HYDROmorphone PCA (1 mG/mL) 30 milliLiter(s) PCA Continuous PCA Continuous  levETIRAcetam  IVPB 500 milliGRAM(s) IV Intermittent every 12 hours  ondansetron Injectable 4 milliGRAM(s) IV Push every 6 hours PRN      Heme:  enoxaparin Injectable 40 milliGRAM(s) SubCutaneous daily    Antibiotics:  clindamycin IVPB 600 milliGRAM(s) IV Intermittent every 8 hours  clindamycin IVPB        Cardiovascular:  amLODIPine   Tablet 5 milliGRAM(s) Oral daily    GI:    Endocrine:    All Other Medications:  naloxone Injectable 0.1 milliGRAM(s) IV Push every 3 minutes PRN      REVIEW OF SYSTEMS:    CONSTITUTIONAL: No fever, weight loss, or fatigue  RESPIRATORY: No cough, wheezing, chills, or hemoptysis, No SOB  NECK: No neck pain  CARDIOVASCULAR: No chest pain, palpitations, dizziness, or leg swelling  GASTROINTESTINAL: No abdominal or epigastric pain.  No nausea, vomiting, or hematemesis.  No diarrhea. + constipation.  GENITOURINARY: No dysuria, frequency, hematuria or incontinence  NEUROLOGICAL: No headaches, memory loss, loss of strength, numbness or tremors  MUSCULOSKELETAL: No joint pain or swelling, No muscle or back pain    Vital Signs Last 24 Hrs  T(C): 36.8 (23 May 2019 14:11), Max: 36.8 (22 May 2019 21:00)  T(F): 98.2 (23 May 2019 14:11), Max: 98.2 (22 May 2019 21:00)  HR: 90 (23 May 2019 14:11) (84 - 97)  BP: 115/76 (23 May 2019 14:11) (112/74 - 146/91)  BP(mean): 93 (22 May 2019 21:00) (93 - 105)  RR: 18 (23 May 2019 14:11) (16 - 20)  SpO2: 97% (23 May 2019 14:11) (97% - 100%)    PAIN SCORE:         SCALE USED: (1-10 VNRS)    PHYSICAL EXAM:    GENERAL: NAD, well-groomed, well-developed   NERVOUS SYSTEM: Alert and oriented x3, Motor strength 5/5 B/L upper and lower extremities, SLR -   CHEST/LUNG: Clear to percussion bilaterally, no rale, rhonchi or wheezing  HEART: Regular rate and rhythm, No murmurs, rubs, or gallops   ABDOMEN: Soft, nontender, nondistended, Bowel sounds present  BACK: No CVA tenderness, No paravetebral tenderness  EXTREMITIES: +2 periperal extremities, no edema, cyanosis + decreased rom     LABS:                          14.0   8.18  )-----------( 273      ( 23 May 2019 06:17 )             42.7     -    139  |  111<H>  |  12  ----------------------------<  117<H>  3.9   |  20<L>  |  0.53    Ca    7.1<L>      23 May 2019 06:17      PT/INR - ( 22 May 2019 10:40 )   PT: 11.9 sec;   INR: 1.07 ratio         PTT - ( 22 May 2019 10:40 )  PTT:37.2 sec      RADIOLOGY:    Drug Screen:        RECOMMENDATIONS    [ ]  [place]  Reviewed and Copied to Chart

## 2019-05-23 NOTE — PROGRESS NOTE ADULT - SUBJECTIVE AND OBJECTIVE BOX
Patient is a 43y old  Female who presents with a chief complaint of Left breast pain and swelling (23 May 2019 11:44)       Pt is seen and examined  Pt is oob to chair  pt seems comfortable and denies any complaints at this time          ROS:  Negative except for:    MEDICATIONS  (STANDING):  amLODIPine   Tablet 5 milliGRAM(s) Oral daily  clindamycin IVPB 600 milliGRAM(s) IV Intermittent every 8 hours  clindamycin IVPB      enoxaparin Injectable 40 milliGRAM(s) SubCutaneous daily  HYDROmorphone PCA (1 mG/mL) 30 milliLiter(s) PCA Continuous PCA Continuous  levETIRAcetam  IVPB 500 milliGRAM(s) IV Intermittent every 12 hours    MEDICATIONS  (PRN):  acetaminophen   Tablet .. 650 milliGRAM(s) Oral every 6 hours PRN Temp greater or equal to 38C (100.4F), Mild Pain (1 - 3)  ALBUTerol/ipratropium for Nebulization. 3 milliLiter(s) Nebulizer every 6 hours PRN Shortness of Breath and/or Wheezing  naloxone Injectable 0.1 milliGRAM(s) IV Push every 3 minutes PRN For ANY of the following changes in patient status:  A. RR LESS THAN 10 breaths per minute, B. Oxygen saturation LESS THAN 90%, C. Sedation score of 6  ondansetron Injectable 4 milliGRAM(s) IV Push every 6 hours PRN Nausea      Allergies    penicillin (Pruritus; Rash)    Intolerances        Vital Signs Last 24 Hrs  T(C): 36.5 (23 May 2019 05:12), Max: 36.8 (22 May 2019 21:00)  T(F): 97.7 (23 May 2019 05:12), Max: 98.2 (22 May 2019 21:00)  HR: 84 (23 May 2019 05:12) (84 - 97)  BP: 115/84 (23 May 2019 05:12) (112/74 - 146/91)  BP(mean): 93 (22 May 2019 21:00) (93 - 105)  RR: 17 (23 May 2019 05:12) (16 - 20)  SpO2: 98% (23 May 2019 05:12) (97% - 100%)    PHYSICAL EXAM  General: adult in NAD  HEENT: clear oropharynx, anicteric sclera, pink conjunctiva  Neck: supple  CV: normal S1/S2 with no murmur rubs or gallops  Lungs: positive air movement b/l ant lungs,clear to auscultation, no wheezes, no rales  Abdomen: soft non-tender non-distended, no hepatosplenomegaly  Ext: no clubbing cyanosis or edema  Skin: no rashes and no petechiae  Neuro: alert and oriented X 4, no focal deficits  LABS:                          14.0   8.18  )-----------( 273      ( 23 May 2019 06:17 )             42.7         Mean Cell Volume : 93.4 fl  Mean Cell Hemoglobin : 30.6 pg  Mean Cell Hemoglobin Concentration : 32.8 gm/dL  Auto Neutrophil # : x  Auto Lymphocyte # : x  Auto Monocyte # : x  Auto Eosinophil # : x  Auto Basophil # : x  Auto Neutrophil % : x  Auto Lymphocyte % : x  Auto Monocyte % : x  Auto Eosinophil % : x  Auto Basophil % : x    Serial CBC's  05-23 @ 06:17  Hct-42.7 / Hgb-14.0 / Plat-273 / RBC-4.57 / WBC-8.18          Serial CBC's  05-22 @ 10:40  Hct-46.8 / Hgb-15.5 / Plat-282 / RBC-5.06 / WBC-6.58          Serial CBC's  05-21 @ 07:29  Hct-45.2 / Hgb-15.2 / Plat-290 / RBC-4.94 / WBC-6.89            05-23    139  |  111<H>  |  12  ----------------------------<  117<H>  3.9   |  20<L>  |  0.53    Ca    7.1<L>      23 May 2019 06:17        PT/INR - ( 22 May 2019 10:40 )   PT: 11.9 sec;   INR: 1.07 ratio         PTT - ( 22 May 2019 10:40 )  PTT:37.2 sec    WBC Count: 8.18 K/uL (05-23-19 @ 06:17)  Hemoglobin: 14.0 g/dL (05-23-19 @ 06:17)  Hematocrit: 42.7 % (05-23-19 @ 06:17)  Platelet Count - Automated: 273 K/uL (05-23-19 @ 06:17)  WBC Count: 6.58 K/uL (05-22-19 @ 10:40)  Hemoglobin: 15.5 g/dL (05-22-19 @ 10:40)  Hematocrit: 46.8 % (05-22-19 @ 10:40)  Platelet Count - Automated: 282 K/uL (05-22-19 @ 10:40)  WBC Count: 6.89 K/uL (05-21-19 @ 07:29)  Hemoglobin: 15.2 g/dL (05-21-19 @ 07:29)  Hematocrit: 45.2 % (05-21-19 @ 07:29)  Platelet Count - Automated: 290 K/uL (05-21-19 @ 07:29)  WBC Count: 6.62 K/uL (05-17-19 @ 08:13)  Hemoglobin: 15.2 g/dL (05-17-19 @ 08:13)  Hematocrit: 44.6 % (05-17-19 @ 08:13)  Platelet Count - Automated: 243 K/uL (05-17-19 @ 08:13)  WBC Count: 7.01 K/uL (05-16-19 @ 10:41)  Hemoglobin: 14.3 g/dL (05-16-19 @ 10:41)  Hematocrit: 42.8 % (05-16-19 @ 10:41)  Platelet Count - Automated: 221 K/uL (05-16-19 @ 10:41)  WBC Count: 6.68 K/uL (05-15-19 @ 22:36)  Hemoglobin: 14.8 g/dL (05-15-19 @ 22:36)  Hematocrit: 44.2 % (05-15-19 @ 22:36)  Platelet Count - Automated: 227 K/uL (05-15-19 @ 22:36)                BLOOD SMEAR INTERPRETATION:       RADIOLOGY & ADDITIONAL STUDIES:

## 2019-05-23 NOTE — CONSULT NOTE ADULT - REASON FOR ADMISSION
Left breast pain and swelling

## 2019-05-23 NOTE — PROGRESS NOTE ADULT - SUBJECTIVE AND OBJECTIVE BOX
Pt is awake, alert, lying in bed in NAD.     INTERVAL HPI/OVERNIGHT EVENTS:      VITAL SIGNS:  T(F): 97.7 (05-23-19 @ 05:12)  HR: 84 (05-23-19 @ 05:12)  BP: 115/84 (05-23-19 @ 05:12)  RR: 17 (05-23-19 @ 05:12)  SpO2: 98% (05-23-19 @ 05:12)  Wt(kg): --  I&O's Detail    22 May 2019 07:01  -  23 May 2019 07:00  --------------------------------------------------------  IN:    IV PiggyBack: 100 mL    Sodium Chloride 0.9% IV Bolus: 600 mL  Total IN: 700 mL    OUT:    Bulb: 38 mL    Bulb: 55 mL    Estimated Blood Loss: 50 mL  Total OUT: 143 mL    Total NET: 557 mL              REVIEW OF SYSTEMS:    CONSTITUTIONAL:  No fevers, chills, sweats    HEENT:  Eyes:  No diplopia or blurred vision. ENT:  No earache, sore throat or runny nose.    CARDIOVASCULAR:  No pressure, squeezing, tightness, or heaviness about the chest; no palpitations.    RESPIRATORY:  Per HPI    GASTROINTESTINAL:  No abdominal pain, nausea, vomiting or diarrhea.    GENITOURINARY:  No dysuria, frequency or urgency.    NEUROLOGIC:  No paresthesias, fasciculations, seizures or weakness.    PSYCHIATRIC:  No disorder of thought or mood.      PHYSICAL EXAM:    Constitutional: Well developed and nourished; left breast mass - firm/hard.   Eyes:Perrla  ENMT: normal  Neck:supple  Respiratory: good air entry; left pleurex.   Cardiovascular: S1 S2 regular  Gastrointestinal: Soft, Non tender  Extremities: No edema  Vascular:normal  Neurological:Awake, alert,Ox3  Musculoskeletal:Normal      MEDICATIONS  (STANDING):  amLODIPine   Tablet 5 milliGRAM(s) Oral daily  clindamycin IVPB 600 milliGRAM(s) IV Intermittent every 8 hours  clindamycin IVPB      enoxaparin Injectable 40 milliGRAM(s) SubCutaneous daily  HYDROmorphone PCA (1 mG/mL) 30 milliLiter(s) PCA Continuous PCA Continuous  levETIRAcetam  IVPB 500 milliGRAM(s) IV Intermittent every 12 hours    MEDICATIONS  (PRN):  acetaminophen   Tablet .. 650 milliGRAM(s) Oral every 6 hours PRN Temp greater or equal to 38C (100.4F), Mild Pain (1 - 3)  ALBUTerol/ipratropium for Nebulization. 3 milliLiter(s) Nebulizer every 6 hours PRN Shortness of Breath and/or Wheezing  naloxone Injectable 0.1 milliGRAM(s) IV Push every 3 minutes PRN For ANY of the following changes in patient status:  A. RR LESS THAN 10 breaths per minute, B. Oxygen saturation LESS THAN 90%, C. Sedation score of 6  ondansetron Injectable 4 milliGRAM(s) IV Push every 6 hours PRN Nausea      Allergies    penicillin (Pruritus; Rash)    Intolerances        LABS:                        14.0   8.18  )-----------( 273      ( 23 May 2019 06:17 )             42.7     05-23    139  |  111<H>  |  12  ----------------------------<  117<H>  3.9   |  20<L>  |  0.53    Ca    7.1<L>      23 May 2019 06:17      PT/INR - ( 22 May 2019 10:40 )   PT: 11.9 sec;   INR: 1.07 ratio         PTT - ( 22 May 2019 10:40 )  PTT:37.2 sec          CAPILLARY BLOOD GLUCOSE            RADIOLOGY & ADDITIONAL TESTS:    CXR:  < from: Xray Chest 1 View- PORTABLE-Routine (05.21.19 @ 10:30) >  IMPRESSION:  No pneumothorax.    < end of copied text >    Ct scan chest:    ekg;    echo: Pt is awake, alert, out of bed in NAD. S/p left mastectomy. Remains with left pig tail cath.    INTERVAL HPI/OVERNIGHT EVENTS:      VITAL SIGNS:  T(F): 97.7 (05-23-19 @ 05:12)  HR: 84 (05-23-19 @ 05:12)  BP: 115/84 (05-23-19 @ 05:12)  RR: 17 (05-23-19 @ 05:12)  SpO2: 98% (05-23-19 @ 05:12)  Wt(kg): --  I&O's Detail    22 May 2019 07:01  -  23 May 2019 07:00  --------------------------------------------------------  IN:    IV PiggyBack: 100 mL    Sodium Chloride 0.9% IV Bolus: 600 mL  Total IN: 700 mL    OUT:    Bulb: 38 mL    Bulb: 55 mL    Estimated Blood Loss: 50 mL  Total OUT: 143 mL    Total NET: 557 mL              REVIEW OF SYSTEMS:    CONSTITUTIONAL:  No fevers, chills, sweats    HEENT:  Eyes:  No diplopia or blurred vision. ENT:  No earache, sore throat or runny nose.    CARDIOVASCULAR:  No pressure, squeezing, tightness, or heaviness about the chest; no palpitations.    RESPIRATORY:  Per HPI    GASTROINTESTINAL:  No abdominal pain, nausea, vomiting or diarrhea.    GENITOURINARY:  No dysuria, frequency or urgency.    NEUROLOGIC:  No paresthesias, fasciculations, seizures or weakness.    PSYCHIATRIC:  No disorder of thought or mood.      PHYSICAL EXAM:    Constitutional: Well developed and nourished; left breast mass - firm/hard.   Eyes:Perrla  ENMT: normal  Neck:supple  Respiratory: good air entry; left pig tail  Cardiovascular: S1 S2 regular  Gastrointestinal: Soft, Non tender  Extremities: No edema  Vascular:normal  Neurological:Awake, alert,Ox3  Musculoskeletal:Normal      MEDICATIONS  (STANDING):  amLODIPine   Tablet 5 milliGRAM(s) Oral daily  clindamycin IVPB 600 milliGRAM(s) IV Intermittent every 8 hours  clindamycin IVPB      enoxaparin Injectable 40 milliGRAM(s) SubCutaneous daily  HYDROmorphone PCA (1 mG/mL) 30 milliLiter(s) PCA Continuous PCA Continuous  levETIRAcetam  IVPB 500 milliGRAM(s) IV Intermittent every 12 hours    MEDICATIONS  (PRN):  acetaminophen   Tablet .. 650 milliGRAM(s) Oral every 6 hours PRN Temp greater or equal to 38C (100.4F), Mild Pain (1 - 3)  ALBUTerol/ipratropium for Nebulization. 3 milliLiter(s) Nebulizer every 6 hours PRN Shortness of Breath and/or Wheezing  naloxone Injectable 0.1 milliGRAM(s) IV Push every 3 minutes PRN For ANY of the following changes in patient status:  A. RR LESS THAN 10 breaths per minute, B. Oxygen saturation LESS THAN 90%, C. Sedation score of 6  ondansetron Injectable 4 milliGRAM(s) IV Push every 6 hours PRN Nausea      Allergies    penicillin (Pruritus; Rash)    Intolerances        LABS:                        14.0   8.18  )-----------( 273      ( 23 May 2019 06:17 )             42.7     05-23    139  |  111<H>  |  12  ----------------------------<  117<H>  3.9   |  20<L>  |  0.53    Ca    7.1<L>      23 May 2019 06:17      PT/INR - ( 22 May 2019 10:40 )   PT: 11.9 sec;   INR: 1.07 ratio         PTT - ( 22 May 2019 10:40 )  PTT:37.2 sec          CAPILLARY BLOOD GLUCOSE            RADIOLOGY & ADDITIONAL TESTS:    CXR:  < from: Xray Chest 1 View- PORTABLE-Routine (05.21.19 @ 10:30) >  IMPRESSION:  No pneumothorax.    < end of copied text >    Ct scan chest:    ekg;    echo:

## 2019-05-23 NOTE — CONSULT NOTE ADULT - CONSULT REQUESTED DATE/TIME
16-May-2019 01:25
16-May-2019 13:07
16-May-2019 17:53
16-May-2019 18:18
20-May-2019 14:47
23-May-2019 16:54
21-May-2019 17:40
16-May-2019

## 2019-05-24 ENCOUNTER — TRANSCRIPTION ENCOUNTER (OUTPATIENT)
Age: 44
End: 2019-05-24

## 2019-05-24 ENCOUNTER — INBOUND DOCUMENT (OUTPATIENT)
Age: 44
End: 2019-05-24

## 2019-05-24 VITALS
TEMPERATURE: 98 F | OXYGEN SATURATION: 98 % | SYSTOLIC BLOOD PRESSURE: 123 MMHG | RESPIRATION RATE: 16 BRPM | DIASTOLIC BLOOD PRESSURE: 80 MMHG | HEART RATE: 87 BPM

## 2019-05-24 PROCEDURE — 71045 X-RAY EXAM CHEST 1 VIEW: CPT | Mod: 26

## 2019-05-24 PROCEDURE — 71045 X-RAY EXAM CHEST 1 VIEW: CPT | Mod: 26,77

## 2019-05-24 PROCEDURE — 99233 SBSQ HOSP IP/OBS HIGH 50: CPT

## 2019-05-24 RX ORDER — LEVETIRACETAM 250 MG/1
1 TABLET, FILM COATED ORAL
Qty: 60 | Refills: 0
Start: 2019-05-24 | End: 2019-06-22

## 2019-05-24 RX ADMIN — HYDROMORPHONE HYDROCHLORIDE 30 MILLILITER(S): 2 INJECTION INTRAMUSCULAR; INTRAVENOUS; SUBCUTANEOUS at 07:13

## 2019-05-24 RX ADMIN — Medication 100 MILLIGRAM(S): at 05:36

## 2019-05-24 RX ADMIN — AMLODIPINE BESYLATE 5 MILLIGRAM(S): 2.5 TABLET ORAL at 05:36

## 2019-05-24 RX ADMIN — LEVETIRACETAM 420 MILLIGRAM(S): 250 TABLET, FILM COATED ORAL at 05:36

## 2019-05-24 NOTE — DISCHARGE NOTE PROVIDER - CARE PROVIDERS DIRECT ADDRESSES
[email or web address]_Unknown

## 2019-05-24 NOTE — PROGRESS NOTE ADULT - SUBJECTIVE AND OBJECTIVE BOX
Patient is a 43y old  Female who presents with a chief complaint of Left breast pain and swelling (24 May 2019 08:33)       Pt is seen and examined  pt is awake and out of bed to chair  pt seems comfortable and denies any complaints at this time          ROS:  Negative except for:    MEDICATIONS  (STANDING):  amLODIPine   Tablet 5 milliGRAM(s) Oral daily  clindamycin IVPB 600 milliGRAM(s) IV Intermittent every 8 hours  clindamycin IVPB      enoxaparin Injectable 40 milliGRAM(s) SubCutaneous daily  HYDROmorphone PCA (1 mG/mL) 30 milliLiter(s) PCA Continuous PCA Continuous  levETIRAcetam  IVPB 500 milliGRAM(s) IV Intermittent every 12 hours    MEDICATIONS  (PRN):  acetaminophen   Tablet .. 650 milliGRAM(s) Oral every 6 hours PRN Temp greater or equal to 38C (100.4F), Mild Pain (1 - 3)  ALBUTerol/ipratropium for Nebulization. 3 milliLiter(s) Nebulizer every 6 hours PRN Shortness of Breath and/or Wheezing  naloxone Injectable 0.1 milliGRAM(s) IV Push every 3 minutes PRN For ANY of the following changes in patient status:  A. RR LESS THAN 10 breaths per minute, B. Oxygen saturation LESS THAN 90%, C. Sedation score of 6  ondansetron Injectable 4 milliGRAM(s) IV Push every 6 hours PRN Nausea      Allergies    penicillin (Pruritus; Rash)    Intolerances        Vital Signs Last 24 Hrs  T(C): 36.8 (24 May 2019 05:35), Max: 36.9 (24 May 2019 00:17)  T(F): 98.3 (24 May 2019 05:35), Max: 98.4 (24 May 2019 00:17)  HR: 76 (24 May 2019 05:35) (76 - 90)  BP: 112/76 (24 May 2019 05:35) (109/78 - 132/82)  BP(mean): --  RR: 17 (24 May 2019 05:35) (17 - 18)  SpO2: 96% (24 May 2019 05:35) (96% - 99%)    PHYSICAL EXAM  General: adult in NAD  HEENT: clear oropharynx, anicteric sclera, pink conjunctiva  Neck: supple  CV: normal S1/S2 with no murmur rubs or gallops  Lungs: positive air movement b/l ant lungs,clear to auscultation, no wheezes, no rales  Abdomen: soft non-tender non-distended, no hepatosplenomegaly  Ext: no clubbing cyanosis or edema  Skin: no rashes and no petechiae  Neuro: alert and oriented X 4, no focal deficits  LABS:                          14.0   8.18  )-----------( 273      ( 23 May 2019 06:17 )             42.7         Mean Cell Volume : 93.4 fl  Mean Cell Hemoglobin : 30.6 pg  Mean Cell Hemoglobin Concentration : 32.8 gm/dL  Auto Neutrophil # : x  Auto Lymphocyte # : x  Auto Monocyte # : x  Auto Eosinophil # : x  Auto Basophil # : x  Auto Neutrophil % : x  Auto Lymphocyte % : x  Auto Monocyte % : x  Auto Eosinophil % : x  Auto Basophil % : x    Serial CBC's  05-23 @ 06:17  Hct-42.7 / Hgb-14.0 / Plat-273 / RBC-4.57 / WBC-8.18          Serial CBC's  05-22 @ 10:40  Hct-46.8 / Hgb-15.5 / Plat-282 / RBC-5.06 / WBC-6.58          Serial CBC's  05-21 @ 07:29  Hct-45.2 / Hgb-15.2 / Plat-290 / RBC-4.94 / WBC-6.89            05-23    139  |  111<H>  |  12  ----------------------------<  117<H>  3.9   |  20<L>  |  0.53    Ca    7.1<L>      23 May 2019 06:17        PT/INR - ( 22 May 2019 10:40 )   PT: 11.9 sec;   INR: 1.07 ratio         PTT - ( 22 May 2019 10:40 )  PTT:37.2 sec    WBC Count: 8.18 K/uL (05-23-19 @ 06:17)  Hemoglobin: 14.0 g/dL (05-23-19 @ 06:17)  Hematocrit: 42.7 % (05-23-19 @ 06:17)  Platelet Count - Automated: 273 K/uL (05-23-19 @ 06:17)  WBC Count: 6.58 K/uL (05-22-19 @ 10:40)  Hemoglobin: 15.5 g/dL (05-22-19 @ 10:40)  Hematocrit: 46.8 % (05-22-19 @ 10:40)  Platelet Count - Automated: 282 K/uL (05-22-19 @ 10:40)  WBC Count: 6.89 K/uL (05-21-19 @ 07:29)  Hemoglobin: 15.2 g/dL (05-21-19 @ 07:29)  Hematocrit: 45.2 % (05-21-19 @ 07:29)  Platelet Count - Automated: 290 K/uL (05-21-19 @ 07:29)  WBC Count: 6.62 K/uL (05-17-19 @ 08:13)  Hemoglobin: 15.2 g/dL (05-17-19 @ 08:13)  Hematocrit: 44.6 % (05-17-19 @ 08:13)  Platelet Count - Automated: 243 K/uL (05-17-19 @ 08:13)  WBC Count: 7.01 K/uL (05-16-19 @ 10:41)  Hemoglobin: 14.3 g/dL (05-16-19 @ 10:41)  Hematocrit: 42.8 % (05-16-19 @ 10:41)  Platelet Count - Automated: 221 K/uL (05-16-19 @ 10:41)  WBC Count: 6.68 K/uL (05-15-19 @ 22:36)  Hemoglobin: 14.8 g/dL (05-15-19 @ 22:36)  Hematocrit: 44.2 % (05-15-19 @ 22:36)  Platelet Count - Automated: 227 K/uL (05-15-19 @ 22:36)                BLOOD SMEAR INTERPRETATION:       RADIOLOGY & ADDITIONAL STUDIES:

## 2019-05-24 NOTE — CHART NOTE - NSCHARTNOTEFT_GEN_A_CORE
AM CXR reviewed, effusion improved and patient had minimal chest tube output overnight. Patient seen and examined at bedside for removal of left chest pigtail catheter. Dressing removed and securing suture clipped. Patient instructed to inhale and hold her breath and tube pulled. Site covered rapidly with xeroform and gauze dressing. Patient tolerated well. Post-removal CXR obtained, no evidence for PTX or air. Patient cleared for discharge from thoracic surgery POV with outpatient thoracic follow up. Please call/ reconsult as needed.

## 2019-05-24 NOTE — DISCHARGE NOTE PROVIDER - CARE PROVIDER_API CALL
Yvon Navarrete (MD)  Surgery  [address] [place] [address]  Phone: [phone]  Fax: [phone]  Follow Up Time: 1 week    Augie Brantley (MD)  Hematology; Internal Medicine; Medical Oncology  [address] [facility], [address]  [place] [id number]  Phone: [phone]  Fax: [phone]  Follow Up Time: 2 weeks

## 2019-05-24 NOTE — DISCHARGE NOTE PROVIDER - PROVIDER TOKENS
PROVIDER:[TOKEN:[[id number]],FOLLOWUP:[1 week]],PROVIDER:[TOKEN:[[id number]],FOLLOWUP:[2 weeks]]

## 2019-05-24 NOTE — DISCHARGE NOTE PROVIDER - NSDCCPCAREPLAN_GEN_ALL_CORE_FT
PRINCIPAL DISCHARGE DIAGNOSIS  Diagnosis: Metastatic breast cancer  Assessment and Plan of Treatment: Please follow up with Dr. Brantley within 2 weeks   Please follow up with Dr. Navarrete within 1 week   You will be discharged with ROSENDA drains. You will need to empty them and record outputs accurately. This will be taught to you by the nursing staff. Please do not remove the ROSENDA drains. They will be removed in the office. Please bring to the office accurate records of output.   No heavy lifting or straining      SECONDARY DISCHARGE DIAGNOSES  Diagnosis: Compression fracture of spine  Assessment and Plan of Treatment:     Diagnosis: Bony metastasis  Assessment and Plan of Treatment:     Diagnosis: Pleural effusion, malignant  Assessment and Plan of Treatment:

## 2019-05-24 NOTE — PROGRESS NOTE ADULT - SUBJECTIVE AND OBJECTIVE BOX
s/p Left Mastectomy 5/22, POD #2  Patient examined at bedside, no overnight events, denies pain      Vital Signs Last 24 Hrs  T(C): 36.8 (24 May 2019 05:35), Max: 36.9 (24 May 2019 00:17)  T(F): 98.3 (24 May 2019 05:35), Max: 98.4 (24 May 2019 00:17)  HR: 76 (24 May 2019 05:35) (76 - 90)  BP: 112/76 (24 May 2019 05:35) (109/78 - 132/82)  BP(mean): --  RR: 17 (24 May 2019 05:35) (17 - 18)  SpO2: 96% (24 May 2019 05:35) (96% - 99%)      Physical Exam  General: AAOx3, No acute distress  Skin: No jaundice, no icterus  Chest: L operative site is minimally tender, no induration or fluctuance, no hematoma, dressing is clean & intact, ROSENDA x2 to bulb suction with serosanguineous output, pigtail in L chest wall   Extremities: non edematous, no calf pain bilaterally

## 2019-05-24 NOTE — PROGRESS NOTE ADULT - REASON FOR ADMISSION
Left breast pain and swelling

## 2019-05-24 NOTE — PROGRESS NOTE ADULT - ASSESSMENT
43y old Female S/p left mastectomy POD#2, S/p left pigtail chest tube placement by IR 5/17 secondary to breast CA and left malignant effusion     - Urgent CXR ordered, will follow up results  - if CXR WNL will remove left pigtail and repeat CXR  - discharge planning later today if post pigtail removal XR WNL

## 2019-05-24 NOTE — DISCHARGE NOTE PROVIDER - HOSPITAL COURSE
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    Large left pleural effusion with complete collapse of the left lower lobe and near complete collapse of the left upper lobe, presumably malignant effusion.    Several sclerotic lesions within the spine most likely representing bony metastatic disease. Compression deformities of T3, T7, and T10 likely representing pathologic compression deformities.    s/p L pigtail catheter was placed on 5/17 by IR. Patient was taken to the OR on 5/22 and underwent L mastectomy. Patient tolerated procedure well. Patient had pigtail removed on 5/24. Patient for d/c home with ROSENDA x 2 and outpatient follow up with Dr. Navarrete and costa

## 2019-05-24 NOTE — PROGRESS NOTE ADULT - ASSESSMENT
42 y/o female with invasive L Breast Cancer s/p L Mastectomy POD#2; L Pleural Effusion      1. Regular Diet  2. DVT PPx  3. Out of bed  4. discharge planning with ROSENDA draine and antibx until Monday 5/27

## 2019-05-24 NOTE — DISCHARGE NOTE NURSING/CASE MANAGEMENT/SOCIAL WORK - NSDCDPATPORTLINK_GEN_ALL_CORE
You can access the [facility] Portal, offered by [facility], by registering with the following website: [email or web address]

## 2019-05-24 NOTE — PROGRESS NOTE ADULT - SUBJECTIVE AND OBJECTIVE BOX
Patient is a 43y old  Female who presents with a chief complaint of Left breast pain and swelling (24 May 2019 08:52)    pt seen in icu [  ], reg med floor [ x  ], bed [x  ], chair at bedside [   ], a+o x3 [ x ], lethargic [  ],  nad [ x ]      left chest tube to pleurovac,   sony x 2         Allergies    penicillin (Pruritus; Rash)        Vitals    T(F): 98.3 (05-24-19 @ 05:35), Max: 98.4 (05-24-19 @ 00:17)  HR: 76 (05-24-19 @ 05:35) (76 - 90)  BP: 112/76 (05-24-19 @ 05:35) (109/78 - 132/82)  RR: 17 (05-24-19 @ 05:35) (17 - 18)  SpO2: 96% (05-24-19 @ 05:35) (96% - 99%)  Wt(kg): --  CAPILLARY BLOOD GLUCOSE          Labs                          14.0   8.18  )-----------( 273      ( 23 May 2019 06:17 )             42.7       05-23    139  |  111<H>  |  12  ----------------------------<  117<H>  3.9   |  20<L>  |  0.53    Ca    7.1<L>      23 May 2019 06:17              Pleural Fl  05-18 @ 03:26   Culture is being performed.  --  --      .Body Fluid  05-18 @ 03:24   No growth at 5 days  --    polymorphonuclear leukocytes seen  No organisms seen  by cytocentrifuge      .Blood  05-16 @ 15:10   No growth at 5 days.  --  --          Radiology Results      Meds    MEDICATIONS  (STANDING):  amLODIPine   Tablet 5 milliGRAM(s) Oral daily  clindamycin IVPB 600 milliGRAM(s) IV Intermittent every 8 hours  clindamycin IVPB      enoxaparin Injectable 40 milliGRAM(s) SubCutaneous daily  levETIRAcetam  IVPB 500 milliGRAM(s) IV Intermittent every 12 hours      MEDICATIONS  (PRN):  acetaminophen   Tablet .. 650 milliGRAM(s) Oral every 6 hours PRN Temp greater or equal to 38C (100.4F), Mild Pain (1 - 3)  ALBUTerol/ipratropium for Nebulization. 3 milliLiter(s) Nebulizer every 6 hours PRN Shortness of Breath and/or Wheezing      Physical Exam      Neuro :  no focal deficits  Respiratory: improving left mid to lower lung breath sounds  CV: RRR, S1S2, no murmurs,   Abdominal: Soft, NT, ND +BS,  Extremities: No edema, + peripheral pulses  +ve dressing at operative site c/d/i, sony x 2 with seroussanginous fluid         ASSESSMENT       left breast cellulitis,   r/o recurrence of breast ca with mets,   left pleural eff likely malignant,   hydropneumothorax  uncontrolled bp poss 2nd to pain,   h/o lung mets,   h/o nvasive ductal ca insitu( ER, [place] negative, Her2 positive),   s/p surgery in 2015, followed by chemotherapy, on maintainance hormonal therapy,   with questionable mets to C-spine and s/p radiation,   HTN      PLAN      pain control,   gi and dvt profilaxis,   s/p left pigtail by ir  thoracic surg f/u noted  keep left pigtail to water seal  change pleurovac as needed once it becomes full  pigtail output decreasing   chest xray with Near complete evacuation of left pleural effusion with a small apical pneumothorax noted   repeat cxr -ve for pneumothorax   pulm f/u  s/p left mastectomy   gen surg f/u noted  Regular Diet  2. DVT PPx  3. Out of bed  4. discharge planning with SONY draine and antibx until Monday 5/27  id f/u   cont clindamycin 600mgs iv q8hrs  cx neg noted above  f/u fluid cytology  heme-onc f/u  no plan to continue with systemic palliative chemotherapy during the inpt course  MRI brain noted +ve for mets   neuro cons noted  Multiple intracranial T2 Flair enhancing lesions consistent with metastatic disease.   cont Keppra 500 mg IV Q12H in the pre and post- operative period followed by PO Keppra.   pt is Safe to undergo surgery with slightly increased risk of cerebral  hemorrhage and seizures.  Because there is no substantial mass effect, dexamethasone is not indicated.  continue current meds Patient is a 43y old  Female who presents with a chief complaint of Left breast pain and swelling (24 May 2019 08:52)    pt seen in icu [  ], reg med floor [ x  ], bed [x  ], chair at bedside [   ], a+o x3 [ x ], lethargic [  ],  nad [ x ]      left chest tube to pleurovac,   sony x 2         Allergies    penicillin (Pruritus; Rash)        Vitals    T(F): 98.3 (05-24-19 @ 05:35), Max: 98.4 (05-24-19 @ 00:17)  HR: 76 (05-24-19 @ 05:35) (76 - 90)  BP: 112/76 (05-24-19 @ 05:35) (109/78 - 132/82)  RR: 17 (05-24-19 @ 05:35) (17 - 18)  SpO2: 96% (05-24-19 @ 05:35) (96% - 99%)  Wt(kg): --  CAPILLARY BLOOD GLUCOSE          Labs                          14.0   8.18  )-----------( 273      ( 23 May 2019 06:17 )             42.7       05-23    139  |  111<H>  |  12  ----------------------------<  117<H>  3.9   |  20<L>  |  0.53    Ca    7.1<L>      23 May 2019 06:17              Pleural Fl  05-18 @ 03:26   Culture is being performed.  --  --      .Body Fluid  05-18 @ 03:24   No growth at 5 days  --    polymorphonuclear leukocytes seen  No organisms seen  by cytocentrifuge      .Blood  05-16 @ 15:10   No growth at 5 days.  --  --          Radiology Results      Meds    MEDICATIONS  (STANDING):  amLODIPine   Tablet 5 milliGRAM(s) Oral daily  clindamycin IVPB 600 milliGRAM(s) IV Intermittent every 8 hours  clindamycin IVPB      enoxaparin Injectable 40 milliGRAM(s) SubCutaneous daily  levETIRAcetam  IVPB 500 milliGRAM(s) IV Intermittent every 12 hours      MEDICATIONS  (PRN):  acetaminophen   Tablet .. 650 milliGRAM(s) Oral every 6 hours PRN Temp greater or equal to 38C (100.4F), Mild Pain (1 - 3)  ALBUTerol/ipratropium for Nebulization. 3 milliLiter(s) Nebulizer every 6 hours PRN Shortness of Breath and/or Wheezing      Physical Exam      Neuro :  no focal deficits  Respiratory: improving left mid to lower lung breath sounds  CV: RRR, S1S2, no murmurs,   Abdominal: Soft, NT, ND +BS,  Extremities: No edema, + peripheral pulses  +ve dressing at operative site c/d/i, sony x 2 with seroussanginous fluid         ASSESSMENT       left breast cellulitis,   r/o recurrence of breast ca with mets,   left pleural eff likely malignant,   hydropneumothorax  uncontrolled bp poss 2nd to pain,   h/o lung mets,   h/o nvasive ductal ca insitu( ER, [place] negative, Her2 positive),   s/p surgery in 2015, followed by chemotherapy, on maintainance hormonal therapy,   with questionable mets to C-spine and s/p radiation,   HTN      PLAN      pain control,   gi and dvt profilaxis,   s/p left pigtail by ir  thoracic surg f/u noted  keep left pigtail to water seal  change pleurovac as needed once it becomes full  pigtail output decreasing   chest xray with Near complete evacuation of left pleural effusion with a small apical pneumothorax noted   repeat cxr -ve for pneumothorax   pulm f/u  s/p left mastectomy   gen surg f/u noted  Regular Diet  Out of bed  id f/u   cont clindamycin 600mgs iv q8hrs  cx neg noted above  f/u fluid cytology  heme-onc f/u  no plan to continue with systemic palliative chemotherapy during the inpt course  MRI brain noted +ve for mets   neuro cons noted  Multiple intracranial T2 Flair enhancing lesions consistent with metastatic disease.   cont Keppra 500 mg IV Q12H in the pre and post- operative period followed by PO Keppra.   pt is Safe to undergo surgery with slightly increased risk of cerebral  hemorrhage and seizures.  Because there is no substantial mass effect, dexamethasone is not indicated.  continue current meds   discharge planning with SONY drain and antibx until Monday 5/27

## 2019-05-24 NOTE — PROGRESS NOTE ADULT - SUBJECTIVE AND OBJECTIVE BOX
S/p left mastectomy POD#2  S/p left pigtail chest tube placement by IR 5/17  Patient seen and examined at bedside with no complaints.   Denies shortness of breath.    Vital Signs Last 24 Hrs  T(F): 98.3 (05-24-19 @ 05:35), Max: 98.4 (05-24-19 @ 00:17)  HR: 76 (05-24-19 @ 05:35)  BP: 112/76 (05-24-19 @ 05:35)  RR: 17 (05-24-19 @ 05:35)  SpO2: 96% (05-24-19 @ 05:35)    GENERAL: Alert, NAD  CHEST/LUNG: left pigtail chest tube in place, no output overnight/last 12 hours; respirations nonlabored; left chest wall dressing in place, ROSENDA x 2 with serosanguineous output  HEART: S1S2, Regular rate and rhythm;     I&O's Detail    23 May 2019 07:01  -  24 May 2019 07:00  --------------------------------------------------------  IN:  Total IN: 0 mL    OUT:    Bulb: 55 mL    Bulb: 60 mL    Chest Tube: 350 mL  Total OUT: 465 mL    Total NET: -465 mL    LABS:                        14.0   8.18  )-----------( 273      ( 23 May 2019 06:17 )             42.7     05-23    139  |  111<H>  |  12  ----------------------------<  117<H>  3.9   |  20<L>  |  0.53    Ca    7.1<L>      23 May 2019 06:17    PT/INR - ( 22 May 2019 10:40 )   PT: 11.9 sec;   INR: 1.07 ratio      PTT - ( 22 May 2019 10:40 )  PTT:37.2 sec

## 2019-05-24 NOTE — PROGRESS NOTE ADULT - ASSESSMENT
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 s/p seizure and s/p whole brain radiation, HTN, comes in with complaint of Left breast pain and SOB due to probable malignant pleural effusion    Problem #1 ONC - patient with known stage IV H2N positive breast ca on palliative anti-neoplastic tx  -appreciate CT surgery and pulmonary inputs  -pt is  benefitting from therapeutic thoracentesis - performed  with possible pleurodesis - at a later date  -optimize pain control and obtain inpt supportive care service input for GOC discussion and symptoms management  -no plan to continue with systemic palliative chemotherapy during the inpt course  - s/p palliative mastectomy  -cytology results from thoracentesis show malignant cells  WIll continue to follow; call with questions; d/w CT surgery, NP and Dr. Navarrete

## 2019-05-24 NOTE — PROGRESS NOTE ADULT - SUBJECTIVE AND OBJECTIVE BOX
Patient is a 43y old  Female who presents with a chief complaint of Left breast pain and swelling (24 May 2019 09:47)  Awake, alert, comfortable in bed in NAD. Awaiting for pig tail cath on left chest to be DCed by thoracic surgery. No significant post op pain on left mastectomy    INTERVAL HPI/OVERNIGHT EVENTS:      VITAL SIGNS:  T(F): 98.3 (05-24-19 @ 05:35)  HR: 76 (05-24-19 @ 05:35)  BP: 112/76 (05-24-19 @ 05:35)  RR: 17 (05-24-19 @ 05:35)  SpO2: 96% (05-24-19 @ 05:35)  Wt(kg): --  I&O's Detail    23 May 2019 07:01  -  24 May 2019 07:00  --------------------------------------------------------  IN:  Total IN: 0 mL    OUT:    Bulb: 55 mL    Bulb: 60 mL    Chest Tube: 350 mL  Total OUT: 465 mL    Total NET: -465 mL              REVIEW OF SYSTEMS:    CONSTITUTIONAL:  No fevers, chills, sweats    HEENT:  Eyes:  No diplopia or blurred vision. ENT:  No earache, sore throat or runny nose.    CARDIOVASCULAR:  No pressure, squeezing, tightness, or heaviness about the chest; no palpitations.    RESPIRATORY:  Per HPI    GASTROINTESTINAL:  No abdominal pain, nausea, vomiting or diarrhea.    GENITOURINARY:  No dysuria, frequency or urgency.    NEUROLOGIC:  No paresthesias, fasciculations, seizures or weakness.    PSYCHIATRIC:  No disorder of thought or mood.      PHYSICAL EXAM:    Constitutional: Well developed and nourished  Eyes:Perrla  ENMT: normal  Neck:supple  Respiratory: good air entry  Cardiovascular: S1 S2 regular  Gastrointestinal: Soft, Non tender  Extremities: No edema  Vascular:normal  Neurological:Awake, alert,Ox3  Musculoskeletal:Normal      MEDICATIONS  (STANDING):  amLODIPine   Tablet 5 milliGRAM(s) Oral daily  clindamycin IVPB 600 milliGRAM(s) IV Intermittent every 8 hours  clindamycin IVPB      enoxaparin Injectable 40 milliGRAM(s) SubCutaneous daily  levETIRAcetam  IVPB 500 milliGRAM(s) IV Intermittent every 12 hours    MEDICATIONS  (PRN):  acetaminophen   Tablet .. 650 milliGRAM(s) Oral every 6 hours PRN Temp greater or equal to 38C (100.4F), Mild Pain (1 - 3)  ALBUTerol/ipratropium for Nebulization. 3 milliLiter(s) Nebulizer every 6 hours PRN Shortness of Breath and/or Wheezing      Allergies    penicillin (Pruritus; Rash)    Intolerances        LABS:                        14.0   8.18  )-----------( 273      ( 23 May 2019 06:17 )             42.7     05-23    139  |  111<H>  |  12  ----------------------------<  117<H>  3.9   |  20<L>  |  0.53    Ca    7.1<L>      23 May 2019 06:17      PT/INR - ( 22 May 2019 10:40 )   PT: 11.9 sec;   INR: 1.07 ratio         PTT - ( 22 May 2019 10:40 )  PTT:37.2 sec          CAPILLARY BLOOD GLUCOSE            RADIOLOGY & ADDITIONAL TESTS:    CXR:  < from: Xray Chest 1 View- PORTABLE-Urgent (05.24.19 @ 10:03) >  IMPRESSION: Slightly increasing infiltrate adjacent a pigtail catheter.    < end of copied text >    Ct scan chest:    ekg;    echo:

## 2019-05-24 NOTE — PROGRESS NOTE ADULT - ASSESSMENT
1. Pleural Effusion  - S/p Large Left pleural effusion with complete collapse of LLL and near complete collapse of NARCISO.   - S/P Pigtail; in place; drainage decreasing.   - R/O malignant effusion - F/U with pathology.   - Bronchodilators  - O2 Supplement  - F/U XR showed increasing left infiltrate. ? Atelectasis vs CA. Doubtful PNA   - Continue to f/u with CXR.  - Thoracic surgery will likely DC pig tail cath today  - Pleurodesis not being considered at this time      2. Metastatic Breast CA   - S/p mastectomy  - S/P chemo  - On hormonal therapy  - Spine Mets  - S/P radiation  - Heme/onc eval  - Surgical follow up  - ID follow up   - Pain control.   - DVT and GI PPX

## 2019-05-28 PROBLEM — C50.919 MALIGNANT NEOPLASM OF UNSPECIFIED SITE OF UNSPECIFIED FEMALE BREAST: Chronic | Status: ACTIVE | Noted: 2019-05-16

## 2019-05-28 LAB — SURGICAL PATHOLOGY STUDY: SIGNIFICANT CHANGE UP

## 2019-05-30 ENCOUNTER — APPOINTMENT (OUTPATIENT)
Dept: SURGERY | Facility: CLINIC | Age: 44
End: 2019-05-30
Payer: MEDICAID

## 2019-05-30 PROCEDURE — 99024 POSTOP FOLLOW-UP VISIT: CPT

## 2019-06-03 ENCOUNTER — INPATIENT (INPATIENT)
Facility: HOSPITAL | Age: 44
LOS: 0 days | DRG: 871 | End: 2019-06-04
Attending: INTERNAL MEDICINE | Admitting: INTERNAL MEDICINE
Payer: MEDICAID

## 2019-06-03 VITALS
WEIGHT: 139.99 LBS | RESPIRATION RATE: 26 BRPM | SYSTOLIC BLOOD PRESSURE: 125 MMHG | DIASTOLIC BLOOD PRESSURE: 78 MMHG | HEART RATE: 96 BPM | HEIGHT: 64 IN | TEMPERATURE: 98 F | OXYGEN SATURATION: 98 %

## 2019-06-03 DIAGNOSIS — Z98.891 HISTORY OF UTERINE SCAR FROM PREVIOUS SURGERY: Chronic | ICD-10-CM

## 2019-06-03 DIAGNOSIS — A41.9 SEPSIS, UNSPECIFIED ORGANISM: ICD-10-CM

## 2019-06-03 DIAGNOSIS — Z90.49 ACQUIRED ABSENCE OF OTHER SPECIFIED PARTS OF DIGESTIVE TRACT: Chronic | ICD-10-CM

## 2019-06-03 LAB
ALBUMIN SERPL ELPH-MCNC: 1.9 G/DL — LOW (ref 3.5–5)
ALBUMIN SERPL ELPH-MCNC: 2.5 G/DL — LOW (ref 3.5–5)
ALBUMIN SERPL ELPH-MCNC: 2.6 G/DL — LOW (ref 3.5–5)
ALP SERPL-CCNC: 150 U/L — HIGH (ref 40–120)
ALP SERPL-CCNC: 191 U/L — HIGH (ref 40–120)
ALP SERPL-CCNC: 199 U/L — HIGH (ref 40–120)
ALT FLD-CCNC: 1542 U/L DA — HIGH (ref 10–60)
ALT FLD-CCNC: SIGNIFICANT CHANGE UP U/L DA (ref 10–60)
ALT FLD-CCNC: SIGNIFICANT CHANGE UP U/L DA (ref 10–60)
ANION GAP SERPL CALC-SCNC: 19 MMOL/L — HIGH (ref 5–17)
ANION GAP SERPL CALC-SCNC: 24 MMOL/L — HIGH (ref 5–17)
ANION GAP SERPL CALC-SCNC: 26 MMOL/L — HIGH (ref 5–17)
ANION GAP SERPL CALC-SCNC: 27 MMOL/L — HIGH (ref 5–17)
APAP SERPL-MCNC: 13 UG/ML — SIGNIFICANT CHANGE UP (ref 10–30)
APPEARANCE UR: ABNORMAL
APPEARANCE UR: ABNORMAL
APTT BLD: 45.6 SEC — HIGH (ref 27.5–36.3)
AST SERPL-CCNC: 2025 U/L — HIGH (ref 10–40)
AST SERPL-CCNC: SIGNIFICANT CHANGE UP U/L (ref 10–40)
AST SERPL-CCNC: SIGNIFICANT CHANGE UP U/L (ref 10–40)
BASE EXCESS BLDA CALC-SCNC: -23.9 MMOL/L — LOW (ref -2–2)
BASE EXCESS BLDA CALC-SCNC: -27 MMOL/L — LOW (ref -2–2)
BASE EXCESS BLDV CALC-SCNC: -24.7 MMOL/L — LOW (ref -2–2)
BASOPHILS # BLD AUTO: 0 K/UL — SIGNIFICANT CHANGE UP (ref 0–0.2)
BASOPHILS NFR BLD AUTO: 0 % — SIGNIFICANT CHANGE UP (ref 0–2)
BILIRUB SERPL-MCNC: 1 MG/DL — SIGNIFICANT CHANGE UP (ref 0.2–1.2)
BILIRUB SERPL-MCNC: 1 MG/DL — SIGNIFICANT CHANGE UP (ref 0.2–1.2)
BILIRUB SERPL-MCNC: 1.2 MG/DL — SIGNIFICANT CHANGE UP (ref 0.2–1.2)
BILIRUB UR-MCNC: ABNORMAL
BILIRUB UR-MCNC: ABNORMAL
BLOOD GAS COMMENTS ARTERIAL: SIGNIFICANT CHANGE UP
BLOOD GAS COMMENTS ARTERIAL: SIGNIFICANT CHANGE UP
BLOOD GAS COMMENTS, VENOUS: SIGNIFICANT CHANGE UP
BUN SERPL-MCNC: 57 MG/DL — HIGH (ref 7–18)
BUN SERPL-MCNC: 60 MG/DL — HIGH (ref 7–18)
BUN SERPL-MCNC: 61 MG/DL — HIGH (ref 7–18)
BUN SERPL-MCNC: 64 MG/DL — HIGH (ref 7–18)
CALCIUM SERPL-MCNC: 5.7 MG/DL — CRITICAL LOW (ref 8.4–10.5)
CALCIUM SERPL-MCNC: 7 MG/DL — LOW (ref 8.4–10.5)
CALCIUM SERPL-MCNC: 7.9 MG/DL — LOW (ref 8.4–10.5)
CALCIUM SERPL-MCNC: 8.1 MG/DL — LOW (ref 8.4–10.5)
CHLORIDE SERPL-SCNC: 100 MMOL/L — SIGNIFICANT CHANGE UP (ref 96–108)
CHLORIDE SERPL-SCNC: 103 MMOL/L — SIGNIFICANT CHANGE UP (ref 96–108)
CHLORIDE SERPL-SCNC: 97 MMOL/L — SIGNIFICANT CHANGE UP (ref 96–108)
CHLORIDE SERPL-SCNC: 97 MMOL/L — SIGNIFICANT CHANGE UP (ref 96–108)
CO2 SERPL-SCNC: 3 MMOL/L — CRITICAL LOW (ref 22–31)
CO2 SERPL-SCNC: 6 MMOL/L — CRITICAL LOW (ref 22–31)
CO2 SERPL-SCNC: 6 MMOL/L — CRITICAL LOW (ref 22–31)
CO2 SERPL-SCNC: 8 MMOL/L — CRITICAL LOW (ref 22–31)
COLOR SPEC: ABNORMAL
COLOR SPEC: ABNORMAL
CREAT SERPL-MCNC: 3.18 MG/DL — HIGH (ref 0.5–1.3)
CREAT SERPL-MCNC: 3.56 MG/DL — HIGH (ref 0.5–1.3)
CREAT SERPL-MCNC: 3.91 MG/DL — HIGH (ref 0.5–1.3)
CREAT SERPL-MCNC: 4.04 MG/DL — HIGH (ref 0.5–1.3)
DIFF PNL FLD: ABNORMAL
DIFF PNL FLD: ABNORMAL
EOSINOPHIL # BLD AUTO: 0 K/UL — SIGNIFICANT CHANGE UP (ref 0–0.5)
EOSINOPHIL NFR BLD AUTO: 0 % — SIGNIFICANT CHANGE UP (ref 0–6)
GLUCOSE BLDC GLUCOMTR-MCNC: 182 MG/DL — HIGH (ref 70–99)
GLUCOSE BLDC GLUCOMTR-MCNC: 230 MG/DL — HIGH (ref 70–99)
GLUCOSE BLDC GLUCOMTR-MCNC: 231 MG/DL — HIGH (ref 70–99)
GLUCOSE SERPL-MCNC: 211 MG/DL — HIGH (ref 70–99)
GLUCOSE SERPL-MCNC: 299 MG/DL — HIGH (ref 70–99)
GLUCOSE SERPL-MCNC: 54 MG/DL — LOW (ref 70–99)
GLUCOSE SERPL-MCNC: 58 MG/DL — LOW (ref 70–99)
GLUCOSE UR QL: 50 MG/DL
GLUCOSE UR QL: 50 MG/DL
HCG SERPL-ACNC: <1 MIU/ML — SIGNIFICANT CHANGE UP
HCO3 BLDA-SCNC: 4 MMOL/L — LOW (ref 23–27)
HCO3 BLDA-SCNC: 4 MMOL/L — LOW (ref 23–27)
HCO3 BLDV-SCNC: 7 MMOL/L — LOW (ref 21–29)
HCT VFR BLD CALC: 50.9 % — HIGH (ref 34.5–45)
HGB BLD-MCNC: 15.4 G/DL — SIGNIFICANT CHANGE UP (ref 11.5–15.5)
HOROWITZ INDEX BLDA+IHG-RTO: 100 — SIGNIFICANT CHANGE UP
HOROWITZ INDEX BLDA+IHG-RTO: 100 — SIGNIFICANT CHANGE UP
HOROWITZ INDEX BLDV+IHG-RTO: 21 — SIGNIFICANT CHANGE UP
INR BLD: 1.98 RATIO — HIGH (ref 0.88–1.16)
KETONES UR-MCNC: ABNORMAL
KETONES UR-MCNC: ABNORMAL
LACTATE SERPL-SCNC: 13.6 MMOL/L — CRITICAL HIGH (ref 0.7–2)
LACTATE SERPL-SCNC: 15.2 MMOL/L — CRITICAL HIGH (ref 0.7–2)
LEUKOCYTE ESTERASE UR-ACNC: ABNORMAL
LEUKOCYTE ESTERASE UR-ACNC: ABNORMAL
LIDOCAIN IGE QN: 230 U/L — SIGNIFICANT CHANGE UP (ref 73–393)
LIDOCAIN IGE QN: 244 U/L — SIGNIFICANT CHANGE UP (ref 73–393)
LYMPHOCYTES # BLD AUTO: 0.89 K/UL — LOW (ref 1–3.3)
LYMPHOCYTES # BLD AUTO: 4 % — LOW (ref 13–44)
MAGNESIUM SERPL-MCNC: 2.3 MG/DL — SIGNIFICANT CHANGE UP (ref 1.6–2.6)
MCHC RBC-ENTMCNC: 30.3 GM/DL — LOW (ref 32–36)
MCHC RBC-ENTMCNC: 30.6 PG — SIGNIFICANT CHANGE UP (ref 27–34)
MCV RBC AUTO: 101.2 FL — HIGH (ref 80–100)
MONOCYTES # BLD AUTO: 0.67 K/UL — SIGNIFICANT CHANGE UP (ref 0–0.9)
MONOCYTES NFR BLD AUTO: 3 % — SIGNIFICANT CHANGE UP (ref 2–14)
NEUTROPHILS # BLD AUTO: 20.41 K/UL — HIGH (ref 1.8–7.4)
NEUTROPHILS NFR BLD AUTO: 92 % — HIGH (ref 43–77)
NITRITE UR-MCNC: NEGATIVE — SIGNIFICANT CHANGE UP
NITRITE UR-MCNC: POSITIVE
NT-PROBNP SERPL-SCNC: 928 PG/ML — HIGH (ref 0–125)
PCO2 BLDA: 11 MMHG — LOW (ref 32–46)
PCO2 BLDA: 18 MMHG — LOW (ref 32–46)
PCO2 BLDV: 37 MMHG — SIGNIFICANT CHANGE UP (ref 35–50)
PH BLDA: 6.98 — CRITICAL LOW (ref 7.35–7.45)
PH BLDA: 7.18 — CRITICAL LOW (ref 7.35–7.45)
PH BLDV: 6.92 — CRITICAL LOW (ref 7.35–7.45)
PH UR: 5 — SIGNIFICANT CHANGE UP (ref 5–8)
PH UR: 5 — SIGNIFICANT CHANGE UP (ref 5–8)
PHOSPHATE SERPL-MCNC: 10.4 MG/DL — SIGNIFICANT CHANGE UP (ref 2.5–4.5)
PLATELET # BLD AUTO: 167 K/UL — SIGNIFICANT CHANGE UP (ref 150–400)
PO2 BLDA: 142 MMHG — HIGH (ref 74–108)
PO2 BLDA: 167 MMHG — HIGH (ref 74–108)
PO2 BLDV: 52 MMHG — HIGH (ref 25–45)
POTASSIUM SERPL-MCNC: 5 MMOL/L — SIGNIFICANT CHANGE UP (ref 3.5–5.3)
POTASSIUM SERPL-MCNC: 5.4 MMOL/L — HIGH (ref 3.5–5.3)
POTASSIUM SERPL-MCNC: 5.9 MMOL/L — HIGH (ref 3.5–5.3)
POTASSIUM SERPL-MCNC: 7.6 MMOL/L — CRITICAL HIGH (ref 3.5–5.3)
POTASSIUM SERPL-SCNC: 5 MMOL/L — SIGNIFICANT CHANGE UP (ref 3.5–5.3)
POTASSIUM SERPL-SCNC: 5.4 MMOL/L — HIGH (ref 3.5–5.3)
POTASSIUM SERPL-SCNC: 5.9 MMOL/L — HIGH (ref 3.5–5.3)
POTASSIUM SERPL-SCNC: 7.6 MMOL/L — CRITICAL HIGH (ref 3.5–5.3)
PROT SERPL-MCNC: 5.1 G/DL — LOW (ref 6–8.3)
PROT SERPL-MCNC: 6.8 G/DL — SIGNIFICANT CHANGE UP (ref 6–8.3)
PROT SERPL-MCNC: 6.9 G/DL — SIGNIFICANT CHANGE UP (ref 6–8.3)
PROT UR-MCNC: 100
PROT UR-MCNC: 100
PROTHROM AB SERPL-ACNC: 22.5 SEC — HIGH (ref 10–12.9)
RBC # BLD: 5.03 M/UL — SIGNIFICANT CHANGE UP (ref 3.8–5.2)
RBC # FLD: 13.9 % — SIGNIFICANT CHANGE UP (ref 10.3–14.5)
SAO2 % BLDA: 97 % — HIGH (ref 92–96)
SAO2 % BLDA: 98 % — HIGH (ref 92–96)
SAO2 % BLDV: 56 % — LOW (ref 67–88)
SODIUM SERPL-SCNC: 125 MMOL/L — LOW (ref 135–145)
SODIUM SERPL-SCNC: 126 MMOL/L — LOW (ref 135–145)
SODIUM SERPL-SCNC: 130 MMOL/L — LOW (ref 135–145)
SODIUM SERPL-SCNC: 135 MMOL/L — SIGNIFICANT CHANGE UP (ref 135–145)
SP GR SPEC: 1.03 — HIGH (ref 1.01–1.02)
SP GR SPEC: 1.03 — HIGH (ref 1.01–1.02)
TROPONIN I SERPL-MCNC: <0.015 NG/ML — SIGNIFICANT CHANGE UP (ref 0–0.04)
TROPONIN I SERPL-MCNC: <0.015 NG/ML — SIGNIFICANT CHANGE UP (ref 0–0.04)
UROBILINOGEN FLD QL: 1
UROBILINOGEN FLD QL: 1
WBC # BLD: 22.19 K/UL — HIGH (ref 3.8–10.5)
WBC # FLD AUTO: 22.19 K/UL — HIGH (ref 3.8–10.5)

## 2019-06-03 PROCEDURE — 71045 X-RAY EXAM CHEST 1 VIEW: CPT | Mod: 26,77

## 2019-06-03 PROCEDURE — 71045 X-RAY EXAM CHEST 1 VIEW: CPT | Mod: 26

## 2019-06-03 PROCEDURE — 99291 CRITICAL CARE FIRST HOUR: CPT

## 2019-06-03 RX ORDER — IPRATROPIUM/ALBUTEROL SULFATE 18-103MCG
3 AEROSOL WITH ADAPTER (GRAM) INHALATION ONCE
Refills: 0 | Status: COMPLETED | OUTPATIENT
Start: 2019-06-03 | End: 2019-06-03

## 2019-06-03 RX ORDER — CEFTRIAXONE 500 MG/1
INJECTION, POWDER, FOR SOLUTION INTRAMUSCULAR; INTRAVENOUS
Refills: 0 | Status: DISCONTINUED | OUTPATIENT
Start: 2019-06-03 | End: 2019-06-03

## 2019-06-03 RX ORDER — AZTREONAM 2 G
500 VIAL (EA) INJECTION EVERY 8 HOURS
Refills: 0 | Status: DISCONTINUED | OUTPATIENT
Start: 2019-06-04 | End: 2019-06-04

## 2019-06-03 RX ORDER — INSULIN HUMAN 100 [IU]/ML
10 INJECTION, SOLUTION SUBCUTANEOUS ONCE
Refills: 0 | Status: DISCONTINUED | OUTPATIENT
Start: 2019-06-03 | End: 2019-06-03

## 2019-06-03 RX ORDER — SODIUM CHLORIDE 9 MG/ML
2000 INJECTION INTRAMUSCULAR; INTRAVENOUS; SUBCUTANEOUS ONCE
Refills: 0 | Status: COMPLETED | OUTPATIENT
Start: 2019-06-03 | End: 2019-06-03

## 2019-06-03 RX ORDER — AZTREONAM 2 G
VIAL (EA) INJECTION
Refills: 0 | Status: DISCONTINUED | OUTPATIENT
Start: 2019-06-03 | End: 2019-06-04

## 2019-06-03 RX ORDER — INSULIN HUMAN 100 [IU]/ML
10 INJECTION, SOLUTION SUBCUTANEOUS ONCE
Refills: 0 | Status: COMPLETED | OUTPATIENT
Start: 2019-06-03 | End: 2019-06-03

## 2019-06-03 RX ORDER — SODIUM CHLORIDE 9 MG/ML
1000 INJECTION INTRAMUSCULAR; INTRAVENOUS; SUBCUTANEOUS ONCE
Refills: 0 | Status: COMPLETED | OUTPATIENT
Start: 2019-06-03 | End: 2019-06-03

## 2019-06-03 RX ORDER — DEXTROSE 50 % IN WATER 50 %
50 SYRINGE (ML) INTRAVENOUS ONCE
Refills: 0 | Status: COMPLETED | OUTPATIENT
Start: 2019-06-03 | End: 2019-06-03

## 2019-06-03 RX ORDER — PHENYLEPHRINE HYDROCHLORIDE 10 MG/ML
0.1 INJECTION INTRAVENOUS
Qty: 40 | Refills: 0 | Status: DISCONTINUED | OUTPATIENT
Start: 2019-06-03 | End: 2019-06-04

## 2019-06-03 RX ORDER — NOREPINEPHRINE BITARTRATE/D5W 8 MG/250ML
0.05 PLASTIC BAG, INJECTION (ML) INTRAVENOUS
Qty: 16 | Refills: 0 | Status: DISCONTINUED | OUTPATIENT
Start: 2019-06-03 | End: 2019-06-04

## 2019-06-03 RX ORDER — SODIUM BICARBONATE 1 MEQ/ML
100 SYRINGE (ML) INTRAVENOUS ONCE
Refills: 0 | Status: COMPLETED | OUTPATIENT
Start: 2019-06-03 | End: 2019-06-03

## 2019-06-03 RX ORDER — SODIUM CHLORIDE 9 MG/ML
10 INJECTION INTRAMUSCULAR; INTRAVENOUS; SUBCUTANEOUS
Refills: 0 | Status: DISCONTINUED | OUTPATIENT
Start: 2019-06-03 | End: 2019-06-04

## 2019-06-03 RX ORDER — CALCIUM GLUCONATE 100 MG/ML
1 VIAL (ML) INTRAVENOUS ONCE
Refills: 0 | Status: COMPLETED | OUTPATIENT
Start: 2019-06-03 | End: 2019-06-03

## 2019-06-03 RX ORDER — SODIUM POLYSTYRENE SULFONATE 4.1 MEQ/G
30 POWDER, FOR SUSPENSION ORAL ONCE
Refills: 0 | Status: COMPLETED | OUTPATIENT
Start: 2019-06-03 | End: 2019-06-03

## 2019-06-03 RX ORDER — VANCOMYCIN HCL 1 G
1000 VIAL (EA) INTRAVENOUS ONCE
Refills: 0 | Status: COMPLETED | OUTPATIENT
Start: 2019-06-03 | End: 2019-06-03

## 2019-06-03 RX ORDER — CHLORHEXIDINE GLUCONATE 213 G/1000ML
1 SOLUTION TOPICAL
Refills: 0 | Status: DISCONTINUED | OUTPATIENT
Start: 2019-06-03 | End: 2019-06-04

## 2019-06-03 RX ORDER — SODIUM CHLORIDE 9 MG/ML
1000 INJECTION, SOLUTION INTRAVENOUS
Refills: 0 | Status: DISCONTINUED | OUTPATIENT
Start: 2019-06-03 | End: 2019-06-04

## 2019-06-03 RX ORDER — DEXTROSE 50 % IN WATER 50 %
100 SYRINGE (ML) INTRAVENOUS ONCE
Refills: 0 | Status: COMPLETED | OUTPATIENT
Start: 2019-06-03 | End: 2019-06-03

## 2019-06-03 RX ORDER — IPRATROPIUM/ALBUTEROL SULFATE 18-103MCG
3 AEROSOL WITH ADAPTER (GRAM) INHALATION EVERY 6 HOURS
Refills: 0 | Status: DISCONTINUED | OUTPATIENT
Start: 2019-06-03 | End: 2019-06-04

## 2019-06-03 RX ORDER — PHYTONADIONE (VIT K1) 5 MG
5 TABLET ORAL ONCE
Refills: 0 | Status: COMPLETED | OUTPATIENT
Start: 2019-06-03 | End: 2019-06-03

## 2019-06-03 RX ORDER — HEPARIN SODIUM 5000 [USP'U]/ML
5000 INJECTION INTRAVENOUS; SUBCUTANEOUS EVERY 8 HOURS
Refills: 0 | Status: DISCONTINUED | OUTPATIENT
Start: 2019-06-03 | End: 2019-06-04

## 2019-06-03 RX ORDER — AZTREONAM 2 G
500 VIAL (EA) INJECTION ONCE
Refills: 0 | Status: COMPLETED | OUTPATIENT
Start: 2019-06-03 | End: 2019-06-03

## 2019-06-03 RX ORDER — PROPOFOL 10 MG/ML
10 INJECTION, EMULSION INTRAVENOUS
Qty: 1000 | Refills: 0 | Status: DISCONTINUED | OUTPATIENT
Start: 2019-06-03 | End: 2019-06-04

## 2019-06-03 RX ORDER — PANTOPRAZOLE SODIUM 20 MG/1
40 TABLET, DELAYED RELEASE ORAL DAILY
Refills: 0 | Status: DISCONTINUED | OUTPATIENT
Start: 2019-06-03 | End: 2019-06-04

## 2019-06-03 RX ADMIN — Medication 50 MILLIGRAM(S): at 20:21

## 2019-06-03 RX ADMIN — Medication 100 MILLILITER(S): at 16:58

## 2019-06-03 RX ADMIN — SODIUM CHLORIDE 2000 MILLILITER(S): 9 INJECTION INTRAMUSCULAR; INTRAVENOUS; SUBCUTANEOUS at 22:59

## 2019-06-03 RX ADMIN — SODIUM CHLORIDE 100 MILLILITER(S): 9 INJECTION, SOLUTION INTRAVENOUS at 18:30

## 2019-06-03 RX ADMIN — Medication 3 MILLILITER(S): at 17:52

## 2019-06-03 RX ADMIN — PHENYLEPHRINE HYDROCHLORIDE 2.35 MICROGRAM(S)/KG/MIN: 10 INJECTION INTRAVENOUS at 20:05

## 2019-06-03 RX ADMIN — SODIUM CHLORIDE 1714.29 MILLILITER(S): 9 INJECTION INTRAMUSCULAR; INTRAVENOUS; SUBCUTANEOUS at 20:32

## 2019-06-03 RX ADMIN — HEPARIN SODIUM 5000 UNIT(S): 5000 INJECTION INTRAVENOUS; SUBCUTANEOUS at 22:15

## 2019-06-03 RX ADMIN — Medication 100 MILLIEQUIVALENT(S): at 16:45

## 2019-06-03 RX ADMIN — PROPOFOL 3.76 MICROGRAM(S)/KG/MIN: 10 INJECTION, EMULSION INTRAVENOUS at 22:58

## 2019-06-03 RX ADMIN — SODIUM CHLORIDE 8000 MILLILITER(S): 9 INJECTION INTRAMUSCULAR; INTRAVENOUS; SUBCUTANEOUS at 16:30

## 2019-06-03 RX ADMIN — Medication 250 MILLIGRAM(S): at 16:58

## 2019-06-03 RX ADMIN — SODIUM POLYSTYRENE SULFONATE 30 GRAM(S): 4.1 POWDER, FOR SUSPENSION ORAL at 18:56

## 2019-06-03 RX ADMIN — Medication 101 MILLIGRAM(S): at 19:44

## 2019-06-03 RX ADMIN — Medication 3 MILLILITER(S): at 20:21

## 2019-06-03 RX ADMIN — Medication 50 MILLILITER(S): at 17:54

## 2019-06-03 RX ADMIN — INSULIN HUMAN 10 UNIT(S): 100 INJECTION, SOLUTION SUBCUTANEOUS at 19:07

## 2019-06-03 RX ADMIN — Medication 200 GRAM(S): at 17:55

## 2019-06-03 RX ADMIN — Medication 3 MILLILITER(S): at 18:40

## 2019-06-03 NOTE — H&P ADULT - ATTENDING COMMENTS
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   In ed patient was put on BIPAP for respiratory distress. Patient lab significant for K 5.9, Hco3 6, Na 113, blood sugar 53. WBC 22 with hypothermia. CXR significant for large left side pleural effusion.   Patient was admitted to ICU for Respiratory distress with work of breathing from metabolic acidosis requiring new BIPAPecondary to acute marah lfailure.     1. Respiratory distress with WOB   -continue with bipap support   Duoneb and oxygen support   stat 2 amp bicarb ivp and start bicarb gtt

## 2019-06-03 NOTE — ED PROVIDER NOTE - OBJECTIVE STATEMENT
43F with h/o metastatic breast cancer, c/b malignant effusion, had pigtail that was dc'ed bc outpt had decreased, mastectomy during month of May 2019, on chemo, last one week ago, comes in bc woke up sob, gasping for air, called 911. No cp or pain anywhere. No fever or chills. Goal of care at moment is full code.

## 2019-06-03 NOTE — H&P ADULT - NSHPPHYSICALEXAM_GEN_ALL_CORE
PHYSICAL EXAM:    GENERAL: NAD, well-developed    HEAD:  Atraumatic, Normocephalic    EYES: EOMI, PERRLA, conjunctiva and sclera clear    NECK: Supple, No JVD    CHEST/LUNG: decrease breath sound on left side of the lung     HEART: Regular rate and rhythm; No murmurs, rubs, or gallops    ABDOMEN: Soft, Nontender, Nondistended; Bowel sounds present    EXTREMITIES:  2+ Peripheral Pulses, No clubbing, cyanosis, or edema    PSYCH: AAOx3    NEUROLOGY: non-focal    SKIN: No rashes or lesions    No other pertinent positive finding except mentioned as above.

## 2019-06-03 NOTE — PROCEDURE NOTE - NSPROCDETAILS_GEN_ALL_CORE
sterile dressing applied/guidewire recovered/sterile technique, catheter placed/ultrasound guidance/lumen(s) aspirated and flushed

## 2019-06-03 NOTE — H&P ADULT - NSHPREVIEWOFSYSTEMS_GEN_ALL_CORE
REVIEW OF SYSTEMS:    CONSTITUTIONAL: No weakness  EYES/ENT: No visual changes;  No vertigo or throat pain   NECK: No pain or stiffness  RESPIRATORY: +shortness of breath  CARDIOVASCULAR: No chest pain or palpitations  GASTROINTESTINAL: No abdominal or epigastric pain. No nausea, vomiting, or hematemesis; No diarrhea or constipation. No melena or hematochezia.  GENITOURINARY: No dysuria, frequency or hematuria  NEUROLOGICAL: No numbness or weakness  SKIN: No itching, rashes  No other complaint except mentioned as above.

## 2019-06-03 NOTE — ED ADULT NURSE NOTE - NSIMPLEMENTINTERV_GEN_ALL_ED
Implemented All Fall Risk Interventions:  [place] to call system. Call bell, personal items and telephone within reach. Instruct patient to call for assistance. Room bathroom lighting operational. Non-slip footwear when patient is off stretcher. Physically safe environment: no spills, clutter or unnecessary equipment. Stretcher in lowest position, wheels locked, appropriate side rails in place. Provide visual cue, wrist band, yellow gown, etc. Monitor gait and stability. Monitor for mental status changes and reorient to person, place, and time. Review medications for side effects contributing to fall risk. Reinforce activity limits and safety measures with patient and family.

## 2019-06-03 NOTE — H&P ADULT - HISTORY OF PRESENT ILLNESS
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   In ed patient was put on BIPAP for respiratory distress. Patient lab significant for K 5.9, Hco3 6, Na 113, blood sugar 53. WBC 22 with hypothermia. CXR significant for large left side pleural effusion.   Patient was admitted to ICU for Respiratory distress with work of breathing from metabolic acidosis requiring new BIPAP.

## 2019-06-03 NOTE — ED ADULT NURSE NOTE - OBJECTIVE STATEMENT
BIB EMS for respiratory distress Pt noted lethargic tachypneic no C/P noted attached to cardiac monitor B/L LE swelling, post surgical scar S/P Mastectomy noted

## 2019-06-03 NOTE — H&P ADULT - ASSESSMENT
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   In ed patient was put on BIPAP for respiratory distress. Patient lab significant for K 5.9, Hco3 6, Na 113, blood sugar 53. WBC 22 with hypothermia. CXR significant for large left side pleural effusion.   Patient was admitted to ICU for Respiratory distress with work of breathing from metabolic acidosis requiring new BIPAP.     1. Respiratory distress with WOB   -continue with bipap support   Duoneb and oxygen support     2. Metabolic acidosis likely from renal failure and sepsis  -bicarb drip and s/p bicarb pushes  [place] and UO monitor  -will f/u lactate and treat sepsis     3. sepsis ?most likely UTI  hypothermia and tachycardia  will follow up lactate and blood culture, UA and urine culture   will start on Aztreonam  s/p 1 dose of vancomycin, to follow up vanc trough level and add vanco routine in AM     4. Hyperkalemia  s/p cocktail and keyexalate  will repeat BMP  no acute T wave changes on EKG    5. hypoglycemia  improved to 180  will do blood sugar Q1 for now until 2 BS normalizes    5. DVT and GI PPx   heparin and protonix

## 2019-06-03 NOTE — ED PROVIDER NOTE - CRITICAL CARE PROVIDED
conducted a detailed discussion of DNR status/additional history taking/consultation with other physicians/direct patient care (not related to procedure)/documentation/interpretation of diagnostic studies

## 2019-06-04 VITALS — SYSTOLIC BLOOD PRESSURE: 269 MMHG | DIASTOLIC BLOOD PRESSURE: 215 MMHG

## 2019-06-04 LAB
ALBUMIN SERPL ELPH-MCNC: 1.5 G/DL — LOW (ref 3.5–5)
ALP SERPL-CCNC: 125 U/L — HIGH (ref 40–120)
ALT FLD-CCNC: 2097 U/L DA — HIGH (ref 10–60)
AMMONIA BLD-MCNC: 75 UMOL/L — HIGH (ref 11–32)
ANION GAP SERPL CALC-SCNC: 24 MMOL/L — HIGH (ref 5–17)
ANION GAP SERPL CALC-SCNC: 26 MMOL/L — HIGH (ref 5–17)
AST SERPL-CCNC: 3614 U/L — HIGH (ref 10–40)
BASOPHILS # BLD AUTO: 0.03 K/UL — SIGNIFICANT CHANGE UP (ref 0–0.2)
BASOPHILS NFR BLD AUTO: 0.2 % — SIGNIFICANT CHANGE UP (ref 0–2)
BILIRUB SERPL-MCNC: 1.5 MG/DL — HIGH (ref 0.2–1.2)
BUN SERPL-MCNC: 48 MG/DL — HIGH (ref 7–18)
BUN SERPL-MCNC: 50 MG/DL — HIGH (ref 7–18)
CALCIUM SERPL-MCNC: 7.1 MG/DL — LOW (ref 8.4–10.5)
CALCIUM SERPL-MCNC: 7.5 MG/DL — LOW (ref 8.4–10.5)
CHLORIDE SERPL-SCNC: 102 MMOL/L — SIGNIFICANT CHANGE UP (ref 96–108)
CHLORIDE SERPL-SCNC: 105 MMOL/L — SIGNIFICANT CHANGE UP (ref 96–108)
CK MB BLD-MCNC: 2.7 % — SIGNIFICANT CHANGE UP (ref 0–3.5)
CK MB CFR SERPL CALC: 5.6 NG/ML — HIGH (ref 0–3.6)
CK SERPL-CCNC: 204 U/L — SIGNIFICANT CHANGE UP (ref 21–215)
CO2 SERPL-SCNC: 8 MMOL/L — CRITICAL LOW (ref 22–31)
CO2 SERPL-SCNC: 9 MMOL/L — CRITICAL LOW (ref 22–31)
CREAT SERPL-MCNC: 2.86 MG/DL — HIGH (ref 0.5–1.3)
CREAT SERPL-MCNC: 3.17 MG/DL — HIGH (ref 0.5–1.3)
EOSINOPHIL # BLD AUTO: 0.01 K/UL — SIGNIFICANT CHANGE UP (ref 0–0.5)
EOSINOPHIL NFR BLD AUTO: 0.1 % — SIGNIFICANT CHANGE UP (ref 0–6)
GLUCOSE SERPL-MCNC: 149 MG/DL — HIGH (ref 70–99)
GLUCOSE SERPL-MCNC: 175 MG/DL — HIGH (ref 70–99)
HCT VFR BLD CALC: 33.4 % — LOW (ref 34.5–45)
HCT VFR BLD CALC: 39.2 % — SIGNIFICANT CHANGE UP (ref 34.5–45)
HGB BLD-MCNC: 11.5 G/DL — SIGNIFICANT CHANGE UP (ref 11.5–15.5)
HGB BLD-MCNC: 9.7 G/DL — LOW (ref 11.5–15.5)
IMM GRANULOCYTES NFR BLD AUTO: 2.3 % — HIGH (ref 0–1.5)
LACTATE SERPL-SCNC: 16.1 MMOL/L — CRITICAL HIGH (ref 0.7–2)
LACTATE SERPL-SCNC: 17.1 MMOL/L — CRITICAL HIGH (ref 0.7–2)
LYMPHOCYTES # BLD AUTO: 1.21 K/UL — SIGNIFICANT CHANGE UP (ref 1–3.3)
LYMPHOCYTES # BLD AUTO: 7.4 % — LOW (ref 13–44)
MAGNESIUM SERPL-MCNC: 2.2 MG/DL — SIGNIFICANT CHANGE UP (ref 1.6–2.6)
MAGNESIUM SERPL-MCNC: 2.3 MG/DL — SIGNIFICANT CHANGE UP (ref 1.6–2.6)
MCHC RBC-ENTMCNC: 29 GM/DL — LOW (ref 32–36)
MCHC RBC-ENTMCNC: 29.3 GM/DL — LOW (ref 32–36)
MCHC RBC-ENTMCNC: 31.2 PG — SIGNIFICANT CHANGE UP (ref 27–34)
MCHC RBC-ENTMCNC: 31.9 PG — SIGNIFICANT CHANGE UP (ref 27–34)
MCV RBC AUTO: 107.4 FL — HIGH (ref 80–100)
MCV RBC AUTO: 108.9 FL — HIGH (ref 80–100)
MONOCYTES # BLD AUTO: 0.86 K/UL — SIGNIFICANT CHANGE UP (ref 0–0.9)
MONOCYTES NFR BLD AUTO: 5.3 % — SIGNIFICANT CHANGE UP (ref 2–14)
NEUTROPHILS # BLD AUTO: 13.83 K/UL — HIGH (ref 1.8–7.4)
NEUTROPHILS NFR BLD AUTO: 84.7 % — HIGH (ref 43–77)
NRBC # BLD: 3 /100 WBCS — HIGH (ref 0–0)
NRBC # BLD: 4 /100 WBCS — HIGH (ref 0–0)
OSMOLALITY UR: 373 MOSM/KG — SIGNIFICANT CHANGE UP (ref 50–1200)
PHOSPHATE SERPL-MCNC: 11.7 MG/DL — HIGH (ref 2.5–4.5)
PHOSPHATE SERPL-MCNC: 9.9 MG/DL — HIGH (ref 2.5–4.5)
PLATELET # BLD AUTO: 66 K/UL — LOW (ref 150–400)
PLATELET # BLD AUTO: 77 K/UL — LOW (ref 150–400)
POTASSIUM SERPL-MCNC: 5.2 MMOL/L — SIGNIFICANT CHANGE UP (ref 3.5–5.3)
POTASSIUM SERPL-MCNC: 5.3 MMOL/L — SIGNIFICANT CHANGE UP (ref 3.5–5.3)
POTASSIUM SERPL-SCNC: 5.2 MMOL/L — SIGNIFICANT CHANGE UP (ref 3.5–5.3)
POTASSIUM SERPL-SCNC: 5.3 MMOL/L — SIGNIFICANT CHANGE UP (ref 3.5–5.3)
PROT SERPL-MCNC: 3.8 G/DL — LOW (ref 6–8.3)
RBC # BLD: 3.11 M/UL — LOW (ref 3.8–5.2)
RBC # BLD: 3.6 M/UL — LOW (ref 3.8–5.2)
RBC # FLD: 13.6 % — SIGNIFICANT CHANGE UP (ref 10.3–14.5)
RBC # FLD: 13.7 % — SIGNIFICANT CHANGE UP (ref 10.3–14.5)
SODIUM SERPL-SCNC: 137 MMOL/L — SIGNIFICANT CHANGE UP (ref 135–145)
SODIUM SERPL-SCNC: 137 MMOL/L — SIGNIFICANT CHANGE UP (ref 135–145)
TROPONIN I SERPL-MCNC: 0.04 NG/ML — SIGNIFICANT CHANGE UP (ref 0–0.04)
URATE SERPL-MCNC: 11.7 MG/DL — HIGH (ref 2.5–7)
VANCOMYCIN TROUGH SERPL-MCNC: 13 UG/ML — SIGNIFICANT CHANGE UP (ref 10–20)
WBC # BLD: 16.32 K/UL — HIGH (ref 3.8–10.5)
WBC # BLD: 17.31 K/UL — HIGH (ref 3.8–10.5)
WBC # FLD AUTO: 16.32 K/UL — HIGH (ref 3.8–10.5)
WBC # FLD AUTO: 17.31 K/UL — HIGH (ref 3.8–10.5)

## 2019-06-04 PROCEDURE — 87086 URINE CULTURE/COLONY COUNT: CPT

## 2019-06-04 PROCEDURE — 85730 THROMBOPLASTIN TIME PARTIAL: CPT

## 2019-06-04 PROCEDURE — 82962 GLUCOSE BLOOD TEST: CPT

## 2019-06-04 PROCEDURE — 82803 BLOOD GASES ANY COMBINATION: CPT

## 2019-06-04 PROCEDURE — 36415 COLL VENOUS BLD VENIPUNCTURE: CPT

## 2019-06-04 PROCEDURE — 81001 URINALYSIS AUTO W/SCOPE: CPT

## 2019-06-04 PROCEDURE — 83880 ASSAY OF NATRIURETIC PEPTIDE: CPT

## 2019-06-04 PROCEDURE — 71045 X-RAY EXAM CHEST 1 VIEW: CPT

## 2019-06-04 PROCEDURE — 83605 ASSAY OF LACTIC ACID: CPT

## 2019-06-04 PROCEDURE — 94640 AIRWAY INHALATION TREATMENT: CPT

## 2019-06-04 PROCEDURE — 83935 ASSAY OF URINE OSMOLALITY: CPT

## 2019-06-04 PROCEDURE — 84100 ASSAY OF PHOSPHORUS: CPT

## 2019-06-04 PROCEDURE — 94003 VENT MGMT INPAT SUBQ DAY: CPT

## 2019-06-04 PROCEDURE — 85027 COMPLETE CBC AUTOMATED: CPT

## 2019-06-04 PROCEDURE — 84550 ASSAY OF BLOOD/URIC ACID: CPT

## 2019-06-04 PROCEDURE — 80053 COMPREHEN METABOLIC PANEL: CPT

## 2019-06-04 PROCEDURE — 83735 ASSAY OF MAGNESIUM: CPT

## 2019-06-04 PROCEDURE — 82550 ASSAY OF CK (CPK): CPT

## 2019-06-04 PROCEDURE — 84484 ASSAY OF TROPONIN QUANT: CPT

## 2019-06-04 PROCEDURE — 80202 ASSAY OF VANCOMYCIN: CPT

## 2019-06-04 PROCEDURE — 99291 CRITICAL CARE FIRST HOUR: CPT | Mod: 25

## 2019-06-04 PROCEDURE — 94660 CPAP INITIATION&MGMT: CPT

## 2019-06-04 PROCEDURE — 36600 WITHDRAWAL OF ARTERIAL BLOOD: CPT

## 2019-06-04 PROCEDURE — 82553 CREATINE MB FRACTION: CPT

## 2019-06-04 PROCEDURE — 85610 PROTHROMBIN TIME: CPT

## 2019-06-04 PROCEDURE — 93005 ELECTROCARDIOGRAM TRACING: CPT

## 2019-06-04 PROCEDURE — 80048 BASIC METABOLIC PNL TOTAL CA: CPT

## 2019-06-04 PROCEDURE — 94002 VENT MGMT INPAT INIT DAY: CPT

## 2019-06-04 PROCEDURE — 80307 DRUG TEST PRSMV CHEM ANLYZR: CPT

## 2019-06-04 PROCEDURE — 87040 BLOOD CULTURE FOR BACTERIA: CPT

## 2019-06-04 PROCEDURE — 83690 ASSAY OF LIPASE: CPT

## 2019-06-04 PROCEDURE — 84702 CHORIONIC GONADOTROPIN TEST: CPT

## 2019-06-04 PROCEDURE — 82140 ASSAY OF AMMONIA: CPT

## 2019-06-04 RX ORDER — ETOMIDATE 2 MG/ML
20 INJECTION INTRAVENOUS ONCE
Refills: 0 | Status: COMPLETED | OUTPATIENT
Start: 2019-06-04 | End: 2019-06-04

## 2019-06-04 RX ORDER — SODIUM CHLORIDE 9 MG/ML
1000 INJECTION, SOLUTION INTRAVENOUS
Refills: 0 | Status: DISCONTINUED | OUTPATIENT
Start: 2019-06-04 | End: 2019-06-04

## 2019-06-04 RX ADMIN — ETOMIDATE 20 MILLIGRAM(S): 2 INJECTION INTRAVENOUS at 01:50

## 2019-06-04 RX ADMIN — Medication 2.94 MICROGRAM(S)/KG/MIN: at 01:04

## 2019-06-04 RX ADMIN — PHENYLEPHRINE HYDROCHLORIDE 2.35 MICROGRAM(S)/KG/MIN: 10 INJECTION INTRAVENOUS at 03:14

## 2019-06-04 RX ADMIN — Medication 2.94 MICROGRAM(S)/KG/MIN: at 03:15

## 2019-06-04 RX ADMIN — PHENYLEPHRINE HYDROCHLORIDE 2.35 MICROGRAM(S)/KG/MIN: 10 INJECTION INTRAVENOUS at 01:05

## 2019-06-04 RX ADMIN — SODIUM CHLORIDE 200 MILLILITER(S): 9 INJECTION, SOLUTION INTRAVENOUS at 01:23

## 2019-06-04 NOTE — DISCHARGE NOTE FOR THE EXPIRED PATIENT - HOSPITAL COURSE
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 In ED patient was placed on BiPAP for respiratory distress. Patient lab significant for K 5.9, HCO3 6, Na 113, blood sugar 53. WBC 22 with hypothermia. CXR significant for large left side pleural effusion. Patient was admitted to ICU for Respiratory distress with increased work of breathing from metabolic acidosis requiring new BIPAP. She was started on bicarb drop due to severe metabolic acidosis likely secondary to multiple etiologies including sepsis, renal failure and liver failure. Blood cx were sent. Patient was given Vancomycin and Aztreonam. She later became lethargic and tired on BiPAP, and therefore was intubated for airway protection. She became hypotensive despite IV boluses. Peripheral pheny was started and central line was placed. Patient then went into PEA and code blue was called. The first code lasted approximately 9 minutes. Repeat labs showed worsening metabolic acidosis. Ventilatory setting and bicarb drip was adjusted. Patient then again coded and ROSC was acheived in ~6 minutes. We had detailed discussion with the patient's brother and .  stated to do CPR once again if she codes. Patient then had a 3rd code blue at 4:12am. Her rhythm remained PEA during the code. She  at 4:32am. Family at bedside, emotional support provided. Family refused autopsy. 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 In ED patient was placed on BiPAP for respiratory distress. Patient lab significant for K 5.9, HCO3 6, Na 113, blood sugar 53. WBC 22 with hypothermia. CXR significant for large left side pleural effusion. Patient was admitted to ICU for Respiratory distress with increased work of breathing from metabolic acidosis requiring new BIPAP. She was started on bicarb drop due to severe metabolic acidosis likely secondary to multiple etiologies including sepsis, renal failure and liver failure. Blood cx were sent. Patient was given Vancomycin and Aztreonam. She later became lethargic and tired on BiPAP, and therefore was intubated for airway protection. She became hypotensive despite IV boluses and vasopressor was started and central line was placed. Patient then went into PEA and code blue was called. The first code lasted approximately 9 minutes. Repeat labs showed worsening metabolic acidosis. Ventilatory setting and bicarb drip was adjusted. Patient then again coded and ROSC was acheived in ~6 minutes. We had detailed discussion with the patient's brother and .  stated to do CPR once again if she codes. Patient then had a 3rd code blue at 4:12am. Her rhythm remained PEA during the code. She  at 4:32am. Family at bedside, emotional support provided. Family refused autopsy.

## 2019-06-04 NOTE — CHART NOTE - NSCHARTNOTEFT_GEN_A_CORE
Code blue was called at 23:50. Rhythm was PEA. CPR was immediately started. Patient received epinephrine x 3, calcium chloride x 1, and sodium bicarbonate x 1. ROSC achieved at 23:59.  at bedside aware. Patient still full code.    Plan to hold off to hypothermia protocol at this time given the poor prognosis of the patient. Will repeat all labs STAT. 1st code blue:    Code blue was called at 23:50. Rhythm was PEA. CPR was immediately started. Patient received epinephrine x 3, calcium chloride x 1, and sodium bicarbonate x 1. ROSC achieved at 23:59.  at bedside aware. Patient still full code.    Plan to hold off to hypothermia protocol at this time given the poor prognosis of the patient. Will repeat all labs STAT.    2nd Code blue:     Called at 12:36am. Rhythm was PEA. Patient received epinephrine x 3, sodium bicarb x 1, and calcium chloride x 1. ROSC at 12:42am. Spoke to the , he is aware. Patient continues to be full code. Patient was hypotensive. Central line was placed and she was started on pressors. Patient was getting on tired on BiPAP, she was intubated for airway protection. She later became unresponsive.     1st code blue:    Code blue was called at 23:50. Rhythm was PEA. CPR was immediately started. Patient received epinephrine x 3, calcium chloride x 1, and sodium bicarbonate x 1. ROSC achieved at 23:59.  at bedside aware. Patient still full code.    Plan to hold off to hypothermia protocol at this time given the poor prognosis of the patient. Will repeat all labs STAT.    2nd Code blue:     Called at 12:36am. Rhythm was PEA. Patient received epinephrine x 3, sodium bicarb x 1, and calcium chloride x 1. ROSC at 12:42am. Spoke to the , he is aware. Patient continues to be full code.    3rd Code blue:     Called at 4:15am. Rhythm was PEA. She received Epinephrine x 5, Sodium bicarb x 3, and Calcium chloride x 2. The rhythm was then asystole. Patient was pronounced at 4:32am. Family aware. Patient was hypotensive. Central line was placed and she was started on pressors. Patient was getting  tired on BiPAP, she was intubated for airway protection. She later became unresponsive.     1st code blue:    Code blue was called at 23:50. Rhythm was PEA. CPR was immediately started. Patient received epinephrine x 3, calcium chloride x 1, and sodium bicarbonate x 1. ROSC achieved at 23:59.  at bedside aware. Patient still full code.    Plan to hold off to hypothermia protocol at this time given the poor prognosis of the patient. Will repeat all labs STAT.    2nd Code blue:     Called at 12:36am. Rhythm was PEA. Patient received epinephrine x 3, sodium bicarb x 1, and calcium chloride x 1. ROSC at 12:42am. Spoke to the , he is aware. Patient continues to be full code.    3rd Code blue:     Called at 4:15am. Rhythm was PEA. She received Epinephrine x 5, Sodium bicarb x 3, and Calcium chloride x 2. The rhythm was then asystole. Patient was pronounced at 4:32am. Family aware.

## 2019-06-04 NOTE — DISCHARGE NOTE FOR THE EXPIRED PATIENT - NS PRELIMINARY CAUSE OF DEATH_RESTRICTED
Respiratory Failure/Sepsis/Cardiopulmonary Arrest/Hepatic Failure/Renal Failure/Multi-organ Dysfunction Syndrome

## 2019-06-05 LAB
CULTURE RESULTS: NO GROWTH — SIGNIFICANT CHANGE UP
CULTURE RESULTS: NO GROWTH — SIGNIFICANT CHANGE UP
SPECIMEN SOURCE: SIGNIFICANT CHANGE UP
SPECIMEN SOURCE: SIGNIFICANT CHANGE UP

## 2019-06-09 LAB
CULTURE RESULTS: SIGNIFICANT CHANGE UP
CULTURE RESULTS: SIGNIFICANT CHANGE UP
SPECIMEN SOURCE: SIGNIFICANT CHANGE UP
SPECIMEN SOURCE: SIGNIFICANT CHANGE UP

## 2019-07-06 LAB
CULTURE RESULTS: SIGNIFICANT CHANGE UP
SPECIMEN SOURCE: SIGNIFICANT CHANGE UP

## 2019-07-10 PROCEDURE — 87205 SMEAR GRAM STAIN: CPT

## 2019-07-10 PROCEDURE — 80048 BASIC METABOLIC PNL TOTAL CA: CPT

## 2019-07-10 PROCEDURE — 80061 LIPID PANEL: CPT

## 2019-07-10 PROCEDURE — 82042 OTHER SOURCE ALBUMIN QUAN EA: CPT

## 2019-07-10 PROCEDURE — 83036 HEMOGLOBIN GLYCOSYLATED A1C: CPT

## 2019-07-10 PROCEDURE — 88342 IMHCHEM/IMCYTCHM 1ST ANTB: CPT

## 2019-07-10 PROCEDURE — 99285 EMERGENCY DEPT VISIT HI MDM: CPT | Mod: 25

## 2019-07-10 PROCEDURE — 86900 BLOOD TYPING SEROLOGIC ABO: CPT

## 2019-07-10 PROCEDURE — 88112 CYTOPATH CELL ENHANCE TECH: CPT

## 2019-07-10 PROCEDURE — 82945 GLUCOSE OTHER FLUID: CPT

## 2019-07-10 PROCEDURE — 84100 ASSAY OF PHOSPHORUS: CPT

## 2019-07-10 PROCEDURE — 84157 ASSAY OF PROTEIN OTHER: CPT

## 2019-07-10 PROCEDURE — 88360 TUMOR IMMUNOHISTOCHEM/MANUAL: CPT

## 2019-07-10 PROCEDURE — C1729: CPT

## 2019-07-10 PROCEDURE — 87075 CULTR BACTERIA EXCEPT BLOOD: CPT

## 2019-07-10 PROCEDURE — 89051 BODY FLUID CELL COUNT: CPT

## 2019-07-10 PROCEDURE — 84443 ASSAY THYROID STIM HORMONE: CPT

## 2019-07-10 PROCEDURE — 93306 TTE W/DOPPLER COMPLETE: CPT

## 2019-07-10 PROCEDURE — 83615 LACTATE (LD) (LDH) ENZYME: CPT

## 2019-07-10 PROCEDURE — 83735 ASSAY OF MAGNESIUM: CPT

## 2019-07-10 PROCEDURE — 87116 MYCOBACTERIA CULTURE: CPT

## 2019-07-10 PROCEDURE — 36415 COLL VENOUS BLD VENIPUNCTURE: CPT

## 2019-07-10 PROCEDURE — 71046 X-RAY EXAM CHEST 2 VIEWS: CPT

## 2019-07-10 PROCEDURE — 85730 THROMBOPLASTIN TIME PARTIAL: CPT

## 2019-07-10 PROCEDURE — C1769: CPT

## 2019-07-10 PROCEDURE — 71045 X-RAY EXAM CHEST 1 VIEW: CPT

## 2019-07-10 PROCEDURE — 96374 THER/PROPH/DIAG INJ IV PUSH: CPT

## 2019-07-10 PROCEDURE — 76937 US GUIDE VASCULAR ACCESS: CPT

## 2019-07-10 PROCEDURE — 70553 MRI BRAIN STEM W/O & W/DYE: CPT

## 2019-07-10 PROCEDURE — 85027 COMPLETE CBC AUTOMATED: CPT

## 2019-07-10 PROCEDURE — 87040 BLOOD CULTURE FOR BACTERIA: CPT

## 2019-07-10 PROCEDURE — 87070 CULTURE OTHR SPECIMN AEROBIC: CPT

## 2019-07-10 PROCEDURE — 85610 PROTHROMBIN TIME: CPT

## 2019-07-10 PROCEDURE — 86850 RBC ANTIBODY SCREEN: CPT

## 2019-07-10 PROCEDURE — 76942 ECHO GUIDE FOR BIOPSY: CPT

## 2019-07-10 PROCEDURE — 88305 TISSUE EXAM BY PATHOLOGIST: CPT

## 2019-07-10 PROCEDURE — 87015 SPECIMEN INFECT AGNT CONCNTJ: CPT

## 2019-07-10 PROCEDURE — 32557 INSERT CATH PLEURA W/ IMAGE: CPT

## 2019-07-10 PROCEDURE — 86901 BLOOD TYPING SEROLOGIC RH(D): CPT

## 2019-07-10 PROCEDURE — 93005 ELECTROCARDIOGRAM TRACING: CPT

## 2019-07-10 PROCEDURE — 88341 IMHCHEM/IMCYTCHM EA ADD ANTB: CPT

## 2019-07-10 PROCEDURE — 80053 COMPREHEN METABOLIC PANEL: CPT

## 2019-07-10 PROCEDURE — 84702 CHORIONIC GONADOTROPIN TEST: CPT

## 2019-07-10 PROCEDURE — 71260 CT THORAX DX C+: CPT

## 2019-07-10 PROCEDURE — 83986 ASSAY PH BODY FLUID NOS: CPT

## 2019-07-10 PROCEDURE — 87206 SMEAR FLUORESCENT/ACID STAI: CPT

## 2020-12-16 NOTE — PROGRESS NOTE ADULT - PROVIDER SPECIALTY LIST ADULT
Heme/Onc
Infectious Disease
Internal Medicine
Neurology
Pulmonology
Surgery
Thoracic Surgery
Internal Medicine
Consultant
Internal Medicine

## 2021-05-10 NOTE — PROGRESS NOTE ADULT - SUBJECTIVE AND OBJECTIVE BOX
NP Note discussed with  Primary Attending    Patient is a 43y old  Female who presents with a chief complaint of Left breast pain and swelling (21 May 2019 14:23)      INTERVAL HPI/OVERNIGHT EVENTS: no new complaints    MEDICATIONS  (STANDING):  amLODIPine   Tablet 5 milliGRAM(s) Oral daily  chlorhexidine 2% Cloths 1 Application(s) Topical daily  clindamycin IVPB      clindamycin IVPB 600 milliGRAM(s) IV Intermittent every 8 hours  enoxaparin Injectable 40 milliGRAM(s) SubCutaneous daily  levETIRAcetam  IVPB 500 milliGRAM(s) IV Intermittent every 12 hours  potassium chloride    Tablet ER 40 milliEquivalent(s) Oral every 4 hours    MEDICATIONS  (PRN):  acetaminophen   Tablet .. 650 milliGRAM(s) Oral every 6 hours PRN Temp greater or equal to 38C (100.4F), Mild Pain (1 - 3)  ALBUTerol/ipratropium for Nebulization. 3 milliLiter(s) Nebulizer every 6 hours PRN Shortness of Breath and/or Wheezing      __________________________________________________  REVIEW OF SYSTEMS:    CONSTITUTIONAL: No fever,   EYES: no acute visual disturbances  NECK: No pain or stiffness  RESPIRATORY: No cough; No shortness of breath  CARDIOVASCULAR: No chest pain, no palpitations  GASTROINTESTINAL: No pain. No nausea or vomiting; No diarrhea   NEUROLOGICAL: No headache or numbness, no tremors  MUSCULOSKELETAL: No joint pain, no muscle pain  GENITOURINARY: no dysuria, no frequency, no hesitancy  PSYCHIATRY: no depression , no anxiety  ALL OTHER  ROS negative        Vital Signs Last 24 Hrs  T(C): 37.4 (21 May 2019 14:00), Max: 37.4 (21 May 2019 14:00)  T(F): 99.4 (21 May 2019 14:00), Max: 99.4 (21 May 2019 14:00)  HR: 100 (21 May 2019 14:00) (90 - 101)  BP: 140/100 (21 May 2019 14:00) (106/61 - 140/100)  BP(mean): --  RR: 18 (21 May 2019 14:00) (18 - 18)  SpO2: 98% (21 May 2019 14:00) (96% - 99%)    ________________________________________________  PHYSICAL EXAM:  GENERAL: NAD  HEENT: Normocephalic;  conjunctivae and sclerae clear; moist mucous membranes;   NECK : supple  CHEST/LUNG: Clear to auscultation bilaterally with good air entry   HEART: S1 S2  regular; no murmurs, gallops or rubs  ABDOMEN: Soft, Nontender, Nondistended; Bowel sounds present  EXTREMITIES: no cyanosis; no edema; no calf tenderness  SKIN: warm and dry; no rash  NERVOUS SYSTEM:  Awake and alert; Oriented  to place, person and time ; no new deficits    _________________________________________________  LABS:                        15.2   6.89  )-----------( 290      ( 21 May 2019 07:29 )             45.2     05-21    140  |  108  |  6<L>  ----------------------------<  89  3.1<L>   |  22  |  0.72    Ca    8.2<L>      21 May 2019 07:29          CAPILLARY BLOOD GLUCOSE    RADIOLOGY & ADDITIONAL TESTS:        IR Procedure. (05.21.19 @ 12:49) >  EXAM:  IR PROCEDURE                          EXAM:  SP  GUID VASC ACCESS                          EXAM:  INDIRA STODDARD GUID PLC NDL SI                          EXAM:  INDIRA HAINES PLEURA W TUB LT SISC                            PROCEDURE DATE:  05/17/2019          INTERPRETATION:  Procedures performed:  1.  Ultrasound guidance.  2.  Left chest tube placement (10.2 [place] pigtail).    Clinical indication: Large left pleural effusion with nearly complete   left lung atelectasis, in the setting of left breastcancer, drainage   requested prior to OR.    Attending: Thomas Mora M.D.    Medications/sedation: Lidocaine 1% subcutaneous.    Contrast: None.    Description of procedure/findings:     Informed consent was obtained.     Patient was placed in the right lateral decubitus position. Ultrasound   images of the left hemithorax were obtained and saved to PACS. There is a   large left pleural effusion. Left lateral hemithorax was prepped and   draped in usual sterile fashion. After administering lidocaine local   anesthesia and under direct ultrasound guidance, 10.2 [place]ic pigtail   catheter was advanced into the pleural fluid using trocar technique.   Pigtail was formed in the pleural space. Dark straw-colored fluid was   immediately aspirated. Fluid was sent for labs including cytology.   Catheter was attached to a Pleur-evac. Catheter secured to the skin with   0 silk. Sterile dressing applied.    Impression:    Left chest tube placement, as described.    Plan:    Do not remove more than 1000 cc over 8 hours.    < end of copied text >        MR Head w/wo IV Cont (05.20.19 @ 14:22) >  EXAM:  MR BRAIN WAW IC                            PROCEDURE DATE:  05/20/2019          INTERPRETATION:  CLINICAL INFORMATION: pre op clearance. Pt. s/p RTs to   cervical spine, now pre op fro mastectomy, needs neurology cleara.   ADMDIAG1: C50.919 MALIGNANT NEOPLASM OF UNSP SITE OF UNSPECIFIED FEMALE   BREAST/.    TECHNIQUE: Multiplanar, multisequence brain MRI was performed following   the administration of ml intravenous contrast.    COMPARISON: No similar prior studies available for comparison.    FINDINGS:    There is an area of encephalomalacia and gliosis in the right occipital   lobe and areas of T2/FLAIR hyperintensity in the superior cerebellum,   vermis, and right inferior cerebellar hemisphere. There are associated   small foci of nodular enhancement suggesting leptomeningeal/parenchymal   metastatic disease.     The ventricles are normal in size for patient's age.     There is no evidence of acute infarct. There are no foci of   susceptibility artifact to suggest hemorrhage.    Flow voids of the major intracranial vessels at the skull base follow   expected course and contour.    Mucosal thickening in the bilateral ethmoid sinuses.    The mastoids demonstrate no signal abnormality.     Bilateral orbits are within normal limits.    IMPRESSION:   An area of encephalomalacia and gliosis in the right occipital lobe and   areas of T2/FLAIR hyperintensity in the superior cerebellum, vermis, and   right inferior cerebellar hemisphere. There are associated small foci of   nodular enhancement suggesting leptomeningeal/parenchymal metastatic   disease    < end of copied text >      Imaging Personally Reviewed:  YES/NO    Consultant(s) Notes Reviewed:   YES/ No    Care Discussed with Consultants :     Plan of care was discussed with patient and /or primary care giver; all questions and concerns were addressed and care was aligned with patient's wishes. None known

## 2022-09-09 NOTE — ED PROVIDER NOTE - CRITERIA ADMIT PEDS PT
Case Management Discharge Note      Final Note: Home    Provided Post Acute Provider List?: N/A  N/A Provider List Comment: Anticipate routine home    Selected Continued Care - Discharged on 9/9/2022 Admission date: 9/5/2022 - Discharge disposition: Home or Self Care            Transportation Services  Private: Car    Final Discharge Disposition Code: 01 - home or self-care  
no
No

## 2023-05-02 NOTE — ED ADULT TRIAGE NOTE - AS TEMP SITE
----- Message from Harsha Navarro DPM sent at 5/2/2023  3:06 PM CDT -----  Regarding: RE: Patient Update 5/2/23  I appreciate all your work and help with her.   Xrays looked fine today. We did a steroid injection to the heel and will see how she responds.           
Patient notified x-rays normal per Dr. Navarro.  
oral

## 2023-09-08 NOTE — ED PROVIDER NOTE - CROS ED [facility] ALL NEG
Please see Nurse Triage Encounter from today 9/8/23 for details.    OJNNIE ShethN, RN  [facility]    
- - -

## 2025-03-26 NOTE — CONSULT NOTE ADULT - BREASTS COMMENTS
left breast fungating mass, no active drainage, erythema, TTP
Ambulatory
5 x 4 cm indurating fungating mass on lower outer quadrant of L breast.

## 2025-04-03 NOTE — ED PROVIDER NOTE - NS ED SCRIBE STATEMENT
Detail Level: Zone
Pathology Discussion Summary: Accession #\\[id number]\\nDate Collected\\n03/13/25
Attending